# Patient Record
Sex: FEMALE | Race: WHITE | NOT HISPANIC OR LATINO
[De-identification: names, ages, dates, MRNs, and addresses within clinical notes are randomized per-mention and may not be internally consistent; named-entity substitution may affect disease eponyms.]

---

## 2017-11-30 PROBLEM — Z00.00 ENCOUNTER FOR PREVENTIVE HEALTH EXAMINATION: Status: ACTIVE | Noted: 2017-11-30

## 2018-03-12 ENCOUNTER — APPOINTMENT (OUTPATIENT)
Dept: OTOLARYNGOLOGY | Facility: CLINIC | Age: 63
End: 2018-03-12
Payer: COMMERCIAL

## 2018-03-12 VITALS
SYSTOLIC BLOOD PRESSURE: 144 MMHG | BODY MASS INDEX: 24.55 KG/M2 | WEIGHT: 130 LBS | DIASTOLIC BLOOD PRESSURE: 84 MMHG | HEIGHT: 61 IN

## 2018-03-12 PROCEDURE — 92557 COMPREHENSIVE HEARING TEST: CPT

## 2018-03-12 PROCEDURE — 99204 OFFICE O/P NEW MOD 45 MIN: CPT | Mod: 25

## 2018-03-12 PROCEDURE — 92567 TYMPANOMETRY: CPT

## 2018-03-18 ENCOUNTER — FORM ENCOUNTER (OUTPATIENT)
Age: 63
End: 2018-03-18

## 2018-03-19 ENCOUNTER — OUTPATIENT (OUTPATIENT)
Dept: OUTPATIENT SERVICES | Facility: HOSPITAL | Age: 63
LOS: 1 days | End: 2018-03-19
Payer: COMMERCIAL

## 2018-03-19 ENCOUNTER — APPOINTMENT (OUTPATIENT)
Dept: MRI IMAGING | Facility: CLINIC | Age: 63
End: 2018-03-19
Payer: COMMERCIAL

## 2018-03-19 DIAGNOSIS — Z00.8 ENCOUNTER FOR OTHER GENERAL EXAMINATION: ICD-10-CM

## 2018-03-19 PROCEDURE — 70553 MRI BRAIN STEM W/O & W/DYE: CPT | Mod: 26

## 2018-03-20 ENCOUNTER — MESSAGE (OUTPATIENT)
Age: 63
End: 2018-03-20

## 2018-03-20 PROCEDURE — 70553 MRI BRAIN STEM W/O & W/DYE: CPT

## 2018-03-20 PROCEDURE — A9585: CPT

## 2018-03-20 PROCEDURE — 82565 ASSAY OF CREATININE: CPT

## 2018-03-23 ENCOUNTER — OUTPATIENT (OUTPATIENT)
Dept: OUTPATIENT SERVICES | Facility: HOSPITAL | Age: 63
LOS: 1 days | End: 2018-03-23
Payer: COMMERCIAL

## 2018-03-23 VITALS
TEMPERATURE: 98 F | WEIGHT: 130.07 LBS | HEART RATE: 72 BPM | RESPIRATION RATE: 17 BRPM | HEIGHT: 61 IN | OXYGEN SATURATION: 99 % | DIASTOLIC BLOOD PRESSURE: 69 MMHG | SYSTOLIC BLOOD PRESSURE: 118 MMHG

## 2018-03-23 DIAGNOSIS — N84.0 POLYP OF CORPUS UTERI: ICD-10-CM

## 2018-03-23 DIAGNOSIS — Z98.890 OTHER SPECIFIED POSTPROCEDURAL STATES: Chronic | ICD-10-CM

## 2018-03-23 PROCEDURE — 85027 COMPLETE CBC AUTOMATED: CPT

## 2018-03-23 PROCEDURE — G0463: CPT

## 2018-03-23 RX ORDER — SODIUM CHLORIDE 9 MG/ML
3 INJECTION INTRAMUSCULAR; INTRAVENOUS; SUBCUTANEOUS EVERY 8 HOURS
Qty: 0 | Refills: 0 | Status: DISCONTINUED | OUTPATIENT
Start: 2018-03-29 | End: 2018-04-13

## 2018-03-23 RX ORDER — LIDOCAINE HCL 20 MG/ML
0.2 VIAL (ML) INJECTION ONCE
Qty: 0 | Refills: 0 | Status: DISCONTINUED | OUTPATIENT
Start: 2018-03-29 | End: 2018-04-13

## 2018-03-23 NOTE — H&P PST ADULT - HISTORY OF PRESENT ILLNESS
62 yr old female with history of fibroids. On routine follow up dx with polyp of corpus uteri for surgery.

## 2018-03-23 NOTE — H&P PST ADULT - NSANTHOSAYNRD_GEN_A_CORE
No. PROSPER screening performed.  STOP BANG Legend: 0-2 = LOW Risk; 3-4 = INTERMEDIATE Risk; 5-8 = HIGH Risk

## 2018-03-28 ENCOUNTER — TRANSCRIPTION ENCOUNTER (OUTPATIENT)
Age: 63
End: 2018-03-28

## 2018-03-29 ENCOUNTER — OUTPATIENT (OUTPATIENT)
Dept: OUTPATIENT SERVICES | Facility: HOSPITAL | Age: 63
LOS: 1 days | End: 2018-03-29
Payer: COMMERCIAL

## 2018-03-29 ENCOUNTER — RESULT REVIEW (OUTPATIENT)
Age: 63
End: 2018-03-29

## 2018-03-29 VITALS
OXYGEN SATURATION: 98 % | WEIGHT: 130.07 LBS | HEART RATE: 66 BPM | SYSTOLIC BLOOD PRESSURE: 118 MMHG | TEMPERATURE: 98 F | DIASTOLIC BLOOD PRESSURE: 74 MMHG | HEIGHT: 61 IN | RESPIRATION RATE: 16 BRPM

## 2018-03-29 VITALS
RESPIRATION RATE: 14 BRPM | TEMPERATURE: 97 F | SYSTOLIC BLOOD PRESSURE: 130 MMHG | OXYGEN SATURATION: 100 % | HEART RATE: 63 BPM | DIASTOLIC BLOOD PRESSURE: 62 MMHG

## 2018-03-29 DIAGNOSIS — Z98.890 OTHER SPECIFIED POSTPROCEDURAL STATES: Chronic | ICD-10-CM

## 2018-03-29 DIAGNOSIS — N84.0 POLYP OF CORPUS UTERI: ICD-10-CM

## 2018-03-29 PROCEDURE — 88305 TISSUE EXAM BY PATHOLOGIST: CPT | Mod: 26

## 2018-03-29 PROCEDURE — 58558 HYSTEROSCOPY BIOPSY: CPT

## 2018-03-29 PROCEDURE — 88305 TISSUE EXAM BY PATHOLOGIST: CPT

## 2018-03-29 RX ORDER — SODIUM CHLORIDE 9 MG/ML
1000 INJECTION, SOLUTION INTRAVENOUS
Qty: 0 | Refills: 0 | Status: DISCONTINUED | OUTPATIENT
Start: 2018-03-29 | End: 2018-04-13

## 2018-03-29 RX ORDER — FAMOTIDINE 10 MG/ML
1 INJECTION INTRAVENOUS
Qty: 0 | Refills: 0 | COMMUNITY

## 2018-03-29 RX ORDER — CELECOXIB 200 MG/1
200 CAPSULE ORAL ONCE
Qty: 0 | Refills: 0 | Status: DISCONTINUED | OUTPATIENT
Start: 2018-03-29 | End: 2018-03-29

## 2018-03-29 RX ORDER — ATORVASTATIN CALCIUM 80 MG/1
1 TABLET, FILM COATED ORAL
Qty: 0 | Refills: 0 | COMMUNITY

## 2018-03-29 RX ORDER — CELECOXIB 200 MG/1
200 CAPSULE ORAL ONCE
Qty: 0 | Refills: 0 | Status: COMPLETED | OUTPATIENT
Start: 2018-03-29 | End: 2018-03-29

## 2018-03-29 RX ORDER — ACETAMINOPHEN 500 MG
1000 TABLET ORAL ONCE
Qty: 0 | Refills: 0 | Status: DISCONTINUED | OUTPATIENT
Start: 2018-03-29 | End: 2018-03-29

## 2018-03-29 RX ORDER — OXYCODONE HYDROCHLORIDE 5 MG/1
5 TABLET ORAL ONCE
Qty: 0 | Refills: 0 | Status: DISCONTINUED | OUTPATIENT
Start: 2018-03-29 | End: 2018-03-29

## 2018-03-29 RX ORDER — ACETAMINOPHEN 500 MG
1000 TABLET ORAL ONCE
Qty: 0 | Refills: 0 | Status: DISCONTINUED | OUTPATIENT
Start: 2018-03-29 | End: 2018-04-13

## 2018-03-29 RX ORDER — ONDANSETRON 8 MG/1
4 TABLET, FILM COATED ORAL ONCE
Qty: 0 | Refills: 0 | Status: COMPLETED | OUTPATIENT
Start: 2018-03-29 | End: 2018-03-29

## 2018-03-29 RX ADMIN — ONDANSETRON 4 MILLIGRAM(S): 8 TABLET, FILM COATED ORAL at 09:10

## 2018-03-29 RX ADMIN — CELECOXIB 200 MILLIGRAM(S): 200 CAPSULE ORAL at 09:10

## 2018-03-29 RX ADMIN — CELECOXIB 200 MILLIGRAM(S): 200 CAPSULE ORAL at 06:38

## 2018-03-29 RX ADMIN — Medication 1000 MILLIGRAM(S): at 06:37

## 2018-03-29 NOTE — ASU DISCHARGE PLAN (ADULT/PEDIATRIC). - NOTIFY
GYN Fever>100.4/Unable to Urinate/Pain not relieved by Medications/Persistent Nausea and Vomiting/Bleeding that does not stop

## 2018-03-29 NOTE — BRIEF OPERATIVE NOTE - PROCEDURE
<<-----Click on this checkbox to enter Procedure Dilation and curettage  03/29/2018    Active  [id number]  Operative hysteroscopy with bipolar resectoscope with automated tissue fragment removal system  03/29/2018    Active  [id number]

## 2018-03-29 NOTE — BRIEF OPERATIVE NOTE - OPERATION/FINDINGS
Anteverted uterus dilated to 7.5mm with eleanor dilators. Small polyp near L ostia and small cervical polyp resected with symphion device.

## 2018-04-03 LAB — SURGICAL PATHOLOGY STUDY: SIGNIFICANT CHANGE UP

## 2018-04-12 ENCOUNTER — APPOINTMENT (OUTPATIENT)
Dept: PHARMACY | Facility: CLINIC | Age: 63
End: 2018-04-12
Payer: SELF-PAY

## 2018-04-12 PROCEDURE — 92591 HEARING AID EXAMINATION & SELECTION BINAURAL: CPT | Mod: NC

## 2018-06-04 ENCOUNTER — APPOINTMENT (OUTPATIENT)
Dept: OTOLARYNGOLOGY | Facility: CLINIC | Age: 63
End: 2018-06-04

## 2018-06-14 ENCOUNTER — APPOINTMENT (OUTPATIENT)
Dept: OTOLARYNGOLOGY | Facility: CLINIC | Age: 63
End: 2018-06-14

## 2018-08-30 PROBLEM — D21.9 BENIGN NEOPLASM OF CONNECTIVE AND OTHER SOFT TISSUE, UNSPECIFIED: Chronic | Status: ACTIVE | Noted: 2018-03-23

## 2018-08-30 PROBLEM — E78.5 HYPERLIPIDEMIA, UNSPECIFIED: Chronic | Status: ACTIVE | Noted: 2018-03-23

## 2018-08-30 PROBLEM — N84.0 POLYP OF CORPUS UTERI: Chronic | Status: ACTIVE | Noted: 2018-03-23

## 2018-11-26 ENCOUNTER — APPOINTMENT (OUTPATIENT)
Dept: OTOLARYNGOLOGY | Facility: CLINIC | Age: 63
End: 2018-11-26
Payer: COMMERCIAL

## 2018-11-26 VITALS
SYSTOLIC BLOOD PRESSURE: 116 MMHG | WEIGHT: 130 LBS | DIASTOLIC BLOOD PRESSURE: 75 MMHG | HEIGHT: 61 IN | BODY MASS INDEX: 24.55 KG/M2

## 2018-11-26 PROCEDURE — 92567 TYMPANOMETRY: CPT

## 2018-11-26 PROCEDURE — 99213 OFFICE O/P EST LOW 20 MIN: CPT | Mod: 25

## 2018-11-26 PROCEDURE — 92557 COMPREHENSIVE HEARING TEST: CPT

## 2018-12-06 ENCOUNTER — APPOINTMENT (OUTPATIENT)
Dept: PHARMACY | Facility: CLINIC | Age: 63
End: 2018-12-06
Payer: SELF-PAY

## 2018-12-06 PROCEDURE — V5010 ASSESSMENT FOR HEARING AID: CPT | Mod: NC

## 2019-01-09 ENCOUNTER — APPOINTMENT (OUTPATIENT)
Dept: PHARMACY | Facility: CLINIC | Age: 64
End: 2019-01-09

## 2019-01-11 ENCOUNTER — MESSAGE (OUTPATIENT)
Age: 64
End: 2019-01-11

## 2019-01-17 ENCOUNTER — APPOINTMENT (OUTPATIENT)
Dept: PHARMACY | Facility: CLINIC | Age: 64
End: 2019-01-17
Payer: SELF-PAY

## 2019-01-17 PROCEDURE — V5299A: CUSTOM | Mod: NC

## 2019-02-06 ENCOUNTER — APPOINTMENT (OUTPATIENT)
Dept: PHARMACY | Facility: CLINIC | Age: 64
End: 2019-02-06
Payer: SELF-PAY

## 2019-02-06 PROCEDURE — V5299A: CUSTOM | Mod: NC

## 2019-02-26 ENCOUNTER — APPOINTMENT (OUTPATIENT)
Dept: PHARMACY | Facility: CLINIC | Age: 64
End: 2019-02-26
Payer: SELF-PAY

## 2019-02-26 PROCEDURE — V5299A: CUSTOM | Mod: NC

## 2019-03-11 ENCOUNTER — APPOINTMENT (OUTPATIENT)
Dept: PHARMACY | Facility: CLINIC | Age: 64
End: 2019-03-11

## 2019-03-12 ENCOUNTER — APPOINTMENT (OUTPATIENT)
Dept: PHARMACY | Facility: CLINIC | Age: 64
End: 2019-03-12
Payer: COMMERCIAL

## 2019-03-12 PROCEDURE — V5299A: CUSTOM | Mod: NC

## 2019-03-19 ENCOUNTER — APPOINTMENT (OUTPATIENT)
Dept: PHARMACY | Facility: CLINIC | Age: 64
End: 2019-03-19
Payer: SELF-PAY

## 2019-03-19 PROCEDURE — V5299A: CUSTOM | Mod: NC

## 2019-03-29 ENCOUNTER — APPOINTMENT (OUTPATIENT)
Dept: PHARMACY | Facility: CLINIC | Age: 64
End: 2019-03-29
Payer: SELF-PAY

## 2019-03-29 PROCEDURE — V5299A: CUSTOM | Mod: NC

## 2019-05-07 ENCOUNTER — APPOINTMENT (OUTPATIENT)
Dept: PHARMACY | Facility: CLINIC | Age: 64
End: 2019-05-07

## 2019-05-29 ENCOUNTER — APPOINTMENT (OUTPATIENT)
Dept: PHARMACY | Facility: CLINIC | Age: 64
End: 2019-05-29
Payer: SELF-PAY

## 2019-05-29 PROCEDURE — V5267D: CUSTOM

## 2019-05-29 PROCEDURE — V5299A: CUSTOM | Mod: NC

## 2019-06-18 ENCOUNTER — APPOINTMENT (OUTPATIENT)
Dept: PHARMACY | Facility: CLINIC | Age: 64
End: 2019-06-18
Payer: SELF-PAY

## 2019-06-18 PROCEDURE — V5299A: CUSTOM | Mod: NC

## 2019-07-12 ENCOUNTER — APPOINTMENT (OUTPATIENT)
Dept: PHARMACY | Facility: CLINIC | Age: 64
End: 2019-07-12
Payer: SELF-PAY

## 2019-07-12 PROCEDURE — V5299A: CUSTOM | Mod: NC

## 2019-09-10 ENCOUNTER — APPOINTMENT (OUTPATIENT)
Dept: PHARMACY | Facility: CLINIC | Age: 64
End: 2019-09-10

## 2019-09-11 ENCOUNTER — APPOINTMENT (OUTPATIENT)
Dept: PHARMACY | Facility: CLINIC | Age: 64
End: 2019-09-11
Payer: SELF-PAY

## 2019-09-11 PROCEDURE — V5299A: CUSTOM | Mod: NC

## 2019-11-25 ENCOUNTER — APPOINTMENT (OUTPATIENT)
Dept: OTOLARYNGOLOGY | Facility: CLINIC | Age: 64
End: 2019-11-25
Payer: COMMERCIAL

## 2019-11-25 PROCEDURE — 92557 COMPREHENSIVE HEARING TEST: CPT

## 2019-11-25 PROCEDURE — 92567 TYMPANOMETRY: CPT

## 2019-11-25 PROCEDURE — 99213 OFFICE O/P EST LOW 20 MIN: CPT | Mod: 25

## 2019-12-15 NOTE — HISTORY OF PRESENT ILLNESS
[de-identified] : 64F here for f/u for hearing loss. Pt with h/o symmetric mixed hearing loss-  but reports inadequate benefit from current hearing aids- did not pursue new HAs since last visit- wears them consistently.  Pt feels hearing may have worsened - states she is unable to hear without hearing aids unless someone is next to her Right ear- has started reading CC while watching TV. Pt denies otalgia, otorrhea, ear infections, tinnitus, dizziness, vertigo or headaches related to hearing.

## 2019-12-30 ENCOUNTER — OTHER (OUTPATIENT)
Age: 64
End: 2019-12-30

## 2020-11-23 ENCOUNTER — APPOINTMENT (OUTPATIENT)
Dept: OTOLARYNGOLOGY | Facility: CLINIC | Age: 65
End: 2020-11-23
Payer: COMMERCIAL

## 2020-11-23 PROCEDURE — 92567 TYMPANOMETRY: CPT

## 2020-11-23 PROCEDURE — 99072 ADDL SUPL MATRL&STAF TM PHE: CPT

## 2020-11-23 PROCEDURE — 99213 OFFICE O/P EST LOW 20 MIN: CPT | Mod: 25

## 2020-11-23 PROCEDURE — 92557 COMPREHENSIVE HEARING TEST: CPT

## 2020-11-23 NOTE — HISTORY OF PRESENT ILLNESS
[de-identified] : 65F here for f/u for mixed hearing loss- with b/l HA - not doing well with hearing aid

## 2020-12-22 ENCOUNTER — APPOINTMENT (OUTPATIENT)
Dept: PHARMACY | Facility: CLINIC | Age: 65
End: 2020-12-22
Payer: SELF-PAY

## 2020-12-22 PROCEDURE — V5267D: CUSTOM

## 2020-12-22 PROCEDURE — V5299A: CUSTOM | Mod: NC,LT

## 2020-12-23 ENCOUNTER — APPOINTMENT (OUTPATIENT)
Dept: PHARMACY | Facility: CLINIC | Age: 65
End: 2020-12-23

## 2021-01-11 ENCOUNTER — APPOINTMENT (OUTPATIENT)
Dept: PHARMACY | Facility: CLINIC | Age: 66
End: 2021-01-11
Payer: SELF-PAY

## 2021-01-11 PROCEDURE — V5299A: CUSTOM | Mod: NC,LT

## 2021-03-03 ENCOUNTER — APPOINTMENT (OUTPATIENT)
Dept: PHARMACY | Facility: CLINIC | Age: 66
End: 2021-03-03
Payer: SELF-PAY

## 2021-03-03 PROCEDURE — V5299A: CUSTOM | Mod: NC

## 2021-11-17 ENCOUNTER — APPOINTMENT (OUTPATIENT)
Dept: PHARMACY | Facility: CLINIC | Age: 66
End: 2021-11-17
Payer: SELF-PAY

## 2021-11-17 PROCEDURE — V5267D: CUSTOM

## 2021-11-22 ENCOUNTER — APPOINTMENT (OUTPATIENT)
Dept: OTOLARYNGOLOGY | Facility: CLINIC | Age: 66
End: 2021-11-22
Payer: COMMERCIAL

## 2021-11-22 PROCEDURE — 99213 OFFICE O/P EST LOW 20 MIN: CPT | Mod: 25

## 2021-11-22 PROCEDURE — 92567 TYMPANOMETRY: CPT

## 2021-11-22 PROCEDURE — 92557 COMPREHENSIVE HEARING TEST: CPT

## 2021-11-22 RX ORDER — ATORVASTATIN CALCIUM 20 MG/1
20 TABLET, FILM COATED ORAL
Qty: 90 | Refills: 0 | Status: DISCONTINUED | COMMUNITY
Start: 2017-03-01 | End: 2021-11-22

## 2021-11-22 RX ORDER — CYCLOBENZAPRINE HYDROCHLORIDE 5 MG/1
5 TABLET, FILM COATED ORAL
Qty: 15 | Refills: 0 | Status: DISCONTINUED | COMMUNITY
Start: 2018-02-22 | End: 2021-11-22

## 2021-11-22 RX ORDER — ROSUVASTATIN CALCIUM 5 MG/1
TABLET, FILM COATED ORAL
Refills: 0 | Status: DISCONTINUED | COMMUNITY
End: 2021-11-22

## 2021-11-22 RX ORDER — MELOXICAM 7.5 MG/1
7.5 TABLET ORAL
Qty: 20 | Refills: 0 | Status: DISCONTINUED | COMMUNITY
Start: 2018-02-22 | End: 2021-11-22

## 2021-12-11 NOTE — DATA REVIEWED
[de-identified] : Ad: Severe to moderate mixed HL. \par As: Profound to moderately-severe mixed HL\par Type A tymps, AU. Excellent WRS, AU.

## 2021-12-11 NOTE — HISTORY OF PRESENT ILLNESS
[de-identified] : 66F here for f/u for mixed hearing loss- with b/l HA- using consistently- hearing feels stable. Pt denies otalgia, otorrhea, ear infections, hearing loss.

## 2021-12-27 ENCOUNTER — APPOINTMENT (OUTPATIENT)
Dept: PHARMACY | Facility: CLINIC | Age: 66
End: 2021-12-27

## 2022-02-11 NOTE — ASU PREOP CHECKLIST - VERIFY SURGICAL SITE/SIDE WITH PATIENT
No apt avail with providers requested, spoke with patient and was able to sched with Dr Myra Plummer for 2/14 done/gyn

## 2022-05-25 ENCOUNTER — APPOINTMENT (OUTPATIENT)
Dept: OTOLARYNGOLOGY | Facility: CLINIC | Age: 67
End: 2022-05-25
Payer: COMMERCIAL

## 2022-05-25 ENCOUNTER — APPOINTMENT (OUTPATIENT)
Dept: PHARMACY | Facility: CLINIC | Age: 67
End: 2022-05-25

## 2022-05-25 VITALS
WEIGHT: 120 LBS | HEART RATE: 60 BPM | DIASTOLIC BLOOD PRESSURE: 69 MMHG | BODY MASS INDEX: 22.66 KG/M2 | HEIGHT: 61 IN | SYSTOLIC BLOOD PRESSURE: 112 MMHG

## 2022-05-25 PROCEDURE — 92567 TYMPANOMETRY: CPT

## 2022-05-25 PROCEDURE — 99213 OFFICE O/P EST LOW 20 MIN: CPT

## 2022-05-25 PROCEDURE — 92557 COMPREHENSIVE HEARING TEST: CPT

## 2022-06-20 ENCOUNTER — APPOINTMENT (OUTPATIENT)
Dept: PHARMACY | Facility: CLINIC | Age: 67
End: 2022-06-20

## 2022-06-23 NOTE — DATA REVIEWED
[de-identified] : Right ear: Essentially moderately-severe/severe rising to moderate mixed HL from 250H-8kHz\par Left ear: Profound rising to moderately-severe mixed HL from 250Hz-8kHz\par Type A tymps Au\par

## 2022-06-23 NOTE — HISTORY OF PRESENT ILLNESS
[de-identified] : 67 yo with mixed hearing loss presents for follow up. Wears bilateral HAs consistently. No noticeable change in hearing. No tinnitus, otalgia, otorrhea, ear infections, dizziness or headaches.

## 2023-05-17 ENCOUNTER — APPOINTMENT (OUTPATIENT)
Dept: PHARMACY | Facility: CLINIC | Age: 68
End: 2023-05-17
Payer: SELF-PAY

## 2023-05-17 ENCOUNTER — APPOINTMENT (OUTPATIENT)
Dept: OTOLARYNGOLOGY | Facility: CLINIC | Age: 68
End: 2023-05-17
Payer: COMMERCIAL

## 2023-05-17 VITALS
SYSTOLIC BLOOD PRESSURE: 113 MMHG | HEIGHT: 61 IN | DIASTOLIC BLOOD PRESSURE: 71 MMHG | WEIGHT: 115 LBS | HEART RATE: 65 BPM | BODY MASS INDEX: 21.71 KG/M2

## 2023-05-17 PROCEDURE — 92567 TYMPANOMETRY: CPT

## 2023-05-17 PROCEDURE — V5267D: CUSTOM

## 2023-05-17 PROCEDURE — 92557 COMPREHENSIVE HEARING TEST: CPT

## 2023-05-17 PROCEDURE — 99213 OFFICE O/P EST LOW 20 MIN: CPT

## 2023-05-17 RX ORDER — OMEPRAZOLE 20 MG/1
TABLET, DELAYED RELEASE ORAL
Refills: 0 | Status: ACTIVE | COMMUNITY

## 2023-05-18 ENCOUNTER — NON-APPOINTMENT (OUTPATIENT)
Age: 68
End: 2023-05-18

## 2023-05-20 NOTE — HISTORY OF PRESENT ILLNESS
[de-identified] : 67 year old woman, annual follow up for mixed hearing loss. History of asymmetrical SNHL - wearing hearing aids - recently has to increase amplification on device - Left worse than Right.  Denies otalgia, otorrhea, tinnitus, dizziness, vertigo, headaches related to ears, recent fevers or ear infection.

## 2023-05-20 NOTE — DATA REVIEWED
[de-identified] : Right - severe rising to a moderate mixed hearing loss\par Left - profound rising to a moderate mixed hearing loss \par Impedance testing reveals normal Type A tympanograms bilaterally\par

## 2023-05-22 ENCOUNTER — APPOINTMENT (OUTPATIENT)
Dept: SURGICAL ONCOLOGY | Facility: CLINIC | Age: 68
End: 2023-05-22
Payer: COMMERCIAL

## 2023-05-22 VITALS
OXYGEN SATURATION: 99 % | HEART RATE: 66 BPM | DIASTOLIC BLOOD PRESSURE: 77 MMHG | BODY MASS INDEX: 21.71 KG/M2 | SYSTOLIC BLOOD PRESSURE: 132 MMHG | RESPIRATION RATE: 16 BRPM | WEIGHT: 115 LBS | HEIGHT: 61 IN

## 2023-05-22 PROCEDURE — 99245 OFF/OP CONSLTJ NEW/EST HI 55: CPT

## 2023-05-22 NOTE — END OF VISIT
REST AND HYDRATE WITH PLENTY OF FLUIDS  CIPRO RX FOR 7 DAYS  PHENAZOPYRIDINE FOR BLADDER SPASM, PAIN, AND URINARY FREQUENCY, URGENCY.  OK TO CONTINUE ADVIL COLD AND SINUS [id number] THAT YOU HAVE BEEN  TYLENOL FOR PAIN  SEE ACUTE CYSTITIS SHEET  SEE SINUS HEADACHE SYMPTOMS    FOLLOW UP WITH YOUR PCP AND RETURN FOR ANY CONCERNS OR PROBLEMS    GOT O THE ER IF WORSE DESPITE TREATMENT  Blood in the Urine    Blood in the urine (hematuria) has many possible causes. If it occurs after an injury (such as a car accident or fall), it is most often a sign of bruising to the kidney or bladder. Common causes of blood in the urine include urinary tract infections, kidney stones, inflammation, tumors, or certain other diseases of the kidney or bladder. Menstruation can cause blood to appear in the urine sample, although it is not coming from the urinary tract.  If only a trace amount of blood is present, it will show up on the urine test, even though the urine may be yellow and not pink or red. This may occur with any of the above conditions, as well as heavy exercise or high fever. In this case, your doctor may want to repeat the urine test on another day. This will show if the blood is still present. If it is, then other tests can be done to find out the cause.  Home care  Follow these home care guidelines:  · If your urine does not appear bloody (pink, brown or red) then you do not need to restrict your activity in any way.  · If you can see blood in your urine, rest and avoid heavy exertion until your next exam. Do not use aspirin, blood thinners, or anti-platelet or anti-inflammatory medicines. These include ibuprofen and naproxen. These thin the blood and may increase bleeding.  Follow-up care  Follow up with your healthcare provider, or as advised. If you were injured and had blood in your urine, you should have a repeat urine test in 1 to 2 days. Contact your doctor for this test.  A radiologist will review any X-rays that  "were taken. You will be told of any new findings that may affect your care.  When to seek medical advice  Call your healthcare provider right away if any of these occur:  · Bright red blood or blood clots in the urine (if you did not have this before)  · Weakness, dizziness or fainting  · New groin, abdominal, or back pain  · Fever of 100.4ºF (38ºC) or higher, or as directed by your healthcare provider  · Repeated vomiting  · Bleeding from the nose or gums or easy bruising  Date Last Reviewed: 9/1/2016 © 2000-2017 [employer]. [address] [place] [address]. All rights reserved. This information is not intended as a substitute for professional medical care. Always follow your healthcare professional's instructions.        Bladder Infection, Female (Adult)    Urine is normally doesn't have any bacteria in it. But bacteria can get into the urinary tract from the skin around the rectum. Or they can travel in the blood from elsewhere in the body. Once they are in your urinary tract, they can cause infection in the urethra (urethritis), the bladder (cystitis), or the kidneys (pyelonephritis).  The most common place for an infection is in the bladder. This is called a bladder infection. This is one of the most common infections in women. Most bladder infections are easily treated. They are not serious unless the infection spreads to the kidney.  The phrases "bladder infection," "UTI," and "cystitis" are often used to describe the same thing. But they are not always the same. Cystitis is an inflammation of the bladder. The most common cause of cystitis is an infection.  Symptoms  The infection causes inflammation in the urethra and bladder. This causes many of the symptoms. The most common symptoms of a bladder infection are:  · Pain or burning when urinating  · Having to urinate more often than usual  · Urgent need to urinate  · Only a small amount of urine comes out  · Blood in " urine  · Abdominal discomfort. This is usually in the lower abdomen above the pubic bone.  · Cloudy urine  · Strong- or bad-smelling urine  · Unable to urinate (urinary retention)  · Unable to hold urine in (urinary incontinence)  · Fever  · Loss of appetite  · Confusion (in older adults)  Causes  Bladder infections are not contagious. You can't get one from someone else, from a toilet seat, or from sharing a bath.  The most common cause of bladder infections is bacteria from the bowels. The bacteria get onto the skin around the opening of the urethra. From there, they can get into the urine and travel up to the bladder, causing inflammation and infection. This usually happens because of:  · Wiping improperly after urinating. Always wipe from front to back.  · Bowel incontinence  · Pregnancy  · Procedures such as having a catheter inserted  · Older age  · Not emptying your bladder. This can allow bacteria a chance to grow in your urine.  · Dehydration  · Constipation  · Sex  · Use of a diaphragm for birth control   Treatment  Bladder infections are diagnosed by a urine test. They are treated with antibiotics and usually clear up quickly without complications. Treatment helps prevent a more serious kidney infection.  Medicines  Medicines can help in the treatment of a bladder infection:  · Take antibiotics until they are used up, even if you feel better. It is important to finish them to make sure the infection has cleared.  · You can use acetaminophen or ibuprofen for pain, fever, or discomfort, unless another medicine was prescribed. If you have chronic liver or kidney disease, talk with your healthcare provider before using these medicines. Also talk with your provider if you've ever had a stomach ulcer or gastrointestinal bleeding, or are taking blood-thinner medicines.  · If you are given phenazopydridine to reduce burning with urination, it will cause your urine to become a bright orange color. This can stain  clothing.  Care and prevention  These self-care steps can help prevent future infections:  · Drink plenty of fluids to prevent dehydration and flush out your bladder. Do this unless you must restrict fluids for other health reasons, or your doctor told you not to.  · Proper cleaning after going to the bathroom is important. Wipe from front to back after using the toilet to prevent the spread of bacteria.  · Urinate more often. Don't try to hold urine in for a long time.  · Wear loose-fitting clothes and cotton underwear. Avoid tight-fitting pants.  · Improve your diet and prevent constipation. Eat more fresh fruit and vegetables, and fiber, and less junk and fatty foods.  · Avoid sex until your symptoms are gone.  · Avoid caffeine, alcohol, and spicy foods. These can irritate your bladder.  · Urinate right after intercourse to flush out your bladder.  · If you use birth control pills and have frequent bladder infections, discuss it with your doctor.  Follow-up care  Call your healthcare provider if all symptoms are not gone after 3 days of treatment. This is especially important if you have repeat infections.  If a culture was done, you will be told if your treatment needs to be changed. If directed, you can call to find out the results.  If X-rays were done, you will be told if the results will affect your treatment.  Call 911  Call 911 if any of the following occur:  · Trouble breathing  · Hard to wake up or confusion  · Fainting or loss of consciousness  · Rapid heart rate  When to seek medical advice  Call your healthcare provider right away if any of these occur:  · Fever of 100.4ºF (38.0ºC) or higher, or as directed by your healthcare provider  · Symptoms are not better by the third day of treatment  · Back or belly (abdominal) pain that gets worse  · Repeated vomiting, or unable to keep medicine down  · Weakness or dizziness  · Vaginal discharge  · Pain, redness, or swelling in the outer vaginal area  (labia)  Date Last Reviewed: 10/1/2016  © 5834-6823 [employer]. [address] [place] [address], [place] [address]. All rights reserved. This information is not intended as a substitute for professional medical care. Always follow your healthcare professional's instructions.         [Time Spent: ___ minutes] : I have spent [unfilled] minutes of time on the encounter.

## 2023-05-23 ENCOUNTER — RESULT REVIEW (OUTPATIENT)
Age: 68
End: 2023-05-23

## 2023-05-30 ENCOUNTER — OUTPATIENT (OUTPATIENT)
Dept: OUTPATIENT SERVICES | Facility: HOSPITAL | Age: 68
LOS: 1 days | End: 2023-05-30
Payer: COMMERCIAL

## 2023-05-30 VITALS
HEART RATE: 64 BPM | TEMPERATURE: 98 F | DIASTOLIC BLOOD PRESSURE: 73 MMHG | RESPIRATION RATE: 16 BRPM | HEIGHT: 61 IN | OXYGEN SATURATION: 98 % | WEIGHT: 117.07 LBS | SYSTOLIC BLOOD PRESSURE: 134 MMHG

## 2023-05-30 DIAGNOSIS — D05.10 INTRADUCTAL CARCINOMA IN SITU OF UNSPECIFIED BREAST: ICD-10-CM

## 2023-05-30 DIAGNOSIS — K21.9 GASTRO-ESOPHAGEAL REFLUX DISEASE WITHOUT ESOPHAGITIS: ICD-10-CM

## 2023-05-30 DIAGNOSIS — Z98.890 OTHER SPECIFIED POSTPROCEDURAL STATES: Chronic | ICD-10-CM

## 2023-05-30 LAB
A1C WITH ESTIMATED AVERAGE GLUCOSE RESULT: 5.8 % — HIGH (ref 4–5.6)
ALBUMIN SERPL ELPH-MCNC: 4.5 G/DL — SIGNIFICANT CHANGE UP (ref 3.3–5)
ALP SERPL-CCNC: 104 U/L — SIGNIFICANT CHANGE UP (ref 40–120)
ALT FLD-CCNC: 28 U/L — SIGNIFICANT CHANGE UP (ref 4–33)
ANION GAP SERPL CALC-SCNC: 12 MMOL/L — SIGNIFICANT CHANGE UP (ref 7–14)
AST SERPL-CCNC: 20 U/L — SIGNIFICANT CHANGE UP (ref 4–32)
BILIRUB SERPL-MCNC: <0.2 MG/DL — SIGNIFICANT CHANGE UP (ref 0.2–1.2)
BUN SERPL-MCNC: 22 MG/DL — SIGNIFICANT CHANGE UP (ref 7–23)
CALCIUM SERPL-MCNC: 9.5 MG/DL — SIGNIFICANT CHANGE UP (ref 8.4–10.5)
CHLORIDE SERPL-SCNC: 103 MMOL/L — SIGNIFICANT CHANGE UP (ref 98–107)
CO2 SERPL-SCNC: 24 MMOL/L — SIGNIFICANT CHANGE UP (ref 22–31)
CREAT SERPL-MCNC: 0.91 MG/DL — SIGNIFICANT CHANGE UP (ref 0.5–1.3)
EGFR: 70 ML/MIN/1.73M2 — SIGNIFICANT CHANGE UP
ESTIMATED AVERAGE GLUCOSE: 120 — SIGNIFICANT CHANGE UP
GLUCOSE SERPL-MCNC: 88 MG/DL — SIGNIFICANT CHANGE UP (ref 70–99)
HCT VFR BLD CALC: 37.3 % — SIGNIFICANT CHANGE UP (ref 34.5–45)
HGB BLD-MCNC: 11.7 G/DL — SIGNIFICANT CHANGE UP (ref 11.5–15.5)
MCHC RBC-ENTMCNC: 27.3 PG — SIGNIFICANT CHANGE UP (ref 27–34)
MCHC RBC-ENTMCNC: 31.4 GM/DL — LOW (ref 32–36)
MCV RBC AUTO: 86.9 FL — SIGNIFICANT CHANGE UP (ref 80–100)
NRBC # BLD: 0 /100 WBCS — SIGNIFICANT CHANGE UP (ref 0–0)
NRBC # FLD: 0 K/UL — SIGNIFICANT CHANGE UP (ref 0–0)
PLATELET # BLD AUTO: 207 K/UL — SIGNIFICANT CHANGE UP (ref 150–400)
POTASSIUM SERPL-MCNC: 4 MMOL/L — SIGNIFICANT CHANGE UP (ref 3.5–5.3)
POTASSIUM SERPL-SCNC: 4 MMOL/L — SIGNIFICANT CHANGE UP (ref 3.5–5.3)
PROT SERPL-MCNC: 6.4 G/DL — SIGNIFICANT CHANGE UP (ref 6–8.3)
RBC # BLD: 4.29 M/UL — SIGNIFICANT CHANGE UP (ref 3.8–5.2)
RBC # FLD: 12.5 % — SIGNIFICANT CHANGE UP (ref 10.3–14.5)
SODIUM SERPL-SCNC: 139 MMOL/L — SIGNIFICANT CHANGE UP (ref 135–145)
WBC # BLD: 7.12 K/UL — SIGNIFICANT CHANGE UP (ref 3.8–10.5)
WBC # FLD AUTO: 7.12 K/UL — SIGNIFICANT CHANGE UP (ref 3.8–10.5)

## 2023-05-30 PROCEDURE — 93010 ELECTROCARDIOGRAM REPORT: CPT

## 2023-05-30 NOTE — H&P PST ADULT - PROBLEM SELECTOR PLAN 1
Patient tentatively scheduled for left breast mag seed localized lumpectomy on 06/06/2023.    Pre-op instructions provided. Pt given verbal and written instructions with teach back on chlorhexidine wash  and pepcid. Pt verbalized understanding with return demonstration.    Labs done.

## 2023-05-30 NOTE — H&P PST ADULT - ATTENDING COMMENTS
D/w pt plan for left magseed localized lumpectomy for DCIS    Discussed r/b/a post op expectations poss complications.      Pt understands and agrees to proceed.

## 2023-05-30 NOTE — H&P PST ADULT - NSICDXPASTMEDICALHX_GEN_ALL_CORE_FT
PAST MEDICAL HISTORY:  2019 novel coronavirus disease (COVID-19)     Hyperlipidemia, unspecified hyperlipidemia type     Intraductal carcinoma in situ of unspecified breast     Leiomyoma     Pneumonia     Polyp of corpus uteri     Prediabetes

## 2023-05-30 NOTE — H&P PST ADULT - HISTORY OF PRESENT ILLNESS
67 year old female with pre op dx of Intraductal carcinoma in situ of unspecified breast is scheduled left breast mag seed localized lumpectomy .

## 2023-05-30 NOTE — H&P PST ADULT - MUSCULOSKELETAL
negative ROM intact/no calf tenderness/normal gait/strength 5/5 bilateral upper extremities/strength 5/5 bilateral lower extremities/extremities exam

## 2023-05-30 NOTE — H&P PST ADULT - SKIN/BREAST COMMENTS
Pt reports abnormal mammogram 05/23. U/S , MRI breast and biopsy done. Pathology demonstrated DCIS, intermediate nuclear grade, ER/MI+.

## 2023-06-01 ENCOUNTER — OUTPATIENT (OUTPATIENT)
Dept: OUTPATIENT SERVICES | Facility: HOSPITAL | Age: 68
LOS: 1 days | End: 2023-06-01
Payer: COMMERCIAL

## 2023-06-01 ENCOUNTER — APPOINTMENT (OUTPATIENT)
Dept: MAMMOGRAPHY | Facility: CLINIC | Age: 68
End: 2023-06-01
Payer: COMMERCIAL

## 2023-06-01 ENCOUNTER — RESULT REVIEW (OUTPATIENT)
Age: 68
End: 2023-06-01

## 2023-06-01 DIAGNOSIS — Z98.890 OTHER SPECIFIED POSTPROCEDURAL STATES: Chronic | ICD-10-CM

## 2023-06-01 DIAGNOSIS — D05.10 INTRADUCTAL CARCINOMA IN SITU OF UNSPECIFIED BREAST: ICD-10-CM

## 2023-06-01 DIAGNOSIS — Z17.0 ESTROGEN RECEPTOR POSITIVE STATUS [ER+]: ICD-10-CM

## 2023-06-01 DIAGNOSIS — D05.12 INTRADUCTAL CARCINOMA IN SITU OF LEFT BREAST: ICD-10-CM

## 2023-06-01 PROBLEM — U07.1 COVID-19: Chronic | Status: ACTIVE | Noted: 2023-05-30

## 2023-06-01 PROBLEM — J18.9 PNEUMONIA, UNSPECIFIED ORGANISM: Chronic | Status: ACTIVE | Noted: 2023-05-30

## 2023-06-01 PROBLEM — R73.03 PREDIABETES: Chronic | Status: ACTIVE | Noted: 2023-05-30

## 2023-06-01 PROCEDURE — 19125 EXCISION BREAST LESION: CPT

## 2023-06-01 PROCEDURE — 19281 PERQ DEVICE BREAST 1ST IMAG: CPT

## 2023-06-01 PROCEDURE — A4648: CPT

## 2023-06-01 PROCEDURE — 19281 PERQ DEVICE BREAST 1ST IMAG: CPT | Mod: LT

## 2023-06-02 LAB — SURGICAL PATHOLOGY STUDY: SIGNIFICANT CHANGE UP

## 2023-06-05 ENCOUNTER — TRANSCRIPTION ENCOUNTER (OUTPATIENT)
Age: 68
End: 2023-06-05

## 2023-06-06 ENCOUNTER — RESULT REVIEW (OUTPATIENT)
Age: 68
End: 2023-06-06

## 2023-06-06 ENCOUNTER — OUTPATIENT (OUTPATIENT)
Dept: OUTPATIENT SERVICES | Facility: HOSPITAL | Age: 68
LOS: 1 days | End: 2023-06-06
Payer: COMMERCIAL

## 2023-06-06 ENCOUNTER — TRANSCRIPTION ENCOUNTER (OUTPATIENT)
Age: 68
End: 2023-06-06

## 2023-06-06 ENCOUNTER — APPOINTMENT (OUTPATIENT)
Dept: SURGICAL ONCOLOGY | Facility: AMBULATORY SURGERY CENTER | Age: 68
End: 2023-06-06

## 2023-06-06 ENCOUNTER — APPOINTMENT (OUTPATIENT)
Dept: MAMMOGRAPHY | Facility: IMAGING CENTER | Age: 68
End: 2023-06-06
Payer: COMMERCIAL

## 2023-06-06 ENCOUNTER — OUTPATIENT (OUTPATIENT)
Dept: OUTPATIENT SERVICES | Facility: HOSPITAL | Age: 68
LOS: 1 days | Discharge: HOME | End: 2023-06-06
Payer: COMMERCIAL

## 2023-06-06 VITALS
DIASTOLIC BLOOD PRESSURE: 73 MMHG | OXYGEN SATURATION: 98 % | HEIGHT: 61 IN | TEMPERATURE: 98 F | WEIGHT: 117.07 LBS | HEART RATE: 64 BPM | RESPIRATION RATE: 16 BRPM | SYSTOLIC BLOOD PRESSURE: 134 MMHG

## 2023-06-06 VITALS
SYSTOLIC BLOOD PRESSURE: 143 MMHG | TEMPERATURE: 97 F | RESPIRATION RATE: 17 BRPM | OXYGEN SATURATION: 97 % | DIASTOLIC BLOOD PRESSURE: 62 MMHG | HEART RATE: 66 BPM

## 2023-06-06 DIAGNOSIS — Z98.890 OTHER SPECIFIED POSTPROCEDURAL STATES: Chronic | ICD-10-CM

## 2023-06-06 DIAGNOSIS — Z00.8 ENCOUNTER FOR OTHER GENERAL EXAMINATION: ICD-10-CM

## 2023-06-06 DIAGNOSIS — D05.10 INTRADUCTAL CARCINOMA IN SITU OF UNSPECIFIED BREAST: ICD-10-CM

## 2023-06-06 PROCEDURE — 19125 EXCISION BREAST LESION: CPT

## 2023-06-06 PROCEDURE — 88307 TISSUE EXAM BY PATHOLOGIST: CPT | Mod: 26

## 2023-06-06 PROCEDURE — 76098 X-RAY EXAM SURGICAL SPECIMEN: CPT | Mod: 26

## 2023-06-06 PROCEDURE — 19301 PARTIAL MASTECTOMY: CPT | Mod: LT,58

## 2023-06-06 PROCEDURE — 76098 X-RAY EXAM SURGICAL SPECIMEN: CPT

## 2023-06-06 RX ORDER — SODIUM CHLORIDE 9 MG/ML
1000 INJECTION, SOLUTION INTRAVENOUS
Refills: 0 | Status: DISCONTINUED | OUTPATIENT
Start: 2023-06-06 | End: 2023-06-20

## 2023-06-06 RX ORDER — OMEPRAZOLE 10 MG/1
1 CAPSULE, DELAYED RELEASE ORAL
Refills: 0 | DISCHARGE

## 2023-06-06 RX ORDER — OXYCODONE HYDROCHLORIDE 5 MG/1
1 TABLET ORAL
Qty: 10 | Refills: 0
Start: 2023-06-06 | End: 2023-06-08

## 2023-06-06 NOTE — ASU DISCHARGE PLAN (ADULT/PEDIATRIC) - CALL YOUR DOCTOR IF YOU HAVE ANY OF THE FOLLOWING:
Bleeding that does not stop/Fever greater than (need to indicate Fahrenheit or Celsius) Bleeding that does not stop/Fever greater than (need to indicate Fahrenheit or Celsius)/Inability to tolerate liquids or foods

## 2023-06-06 NOTE — ASU DISCHARGE PLAN (ADULT/PEDIATRIC) - CARE PROVIDER_API CALL
Federico Stahl  Surgery  [address] [place] [id number]  Phone: [phone]  Fax: [phone]  Follow Up Time: 2 weeks

## 2023-06-06 NOTE — ASU PREOPERATIVE ASSESSMENT, ADULT ([facility]ARK ONLY) - FALL HARM RISK - UNIVERSAL INTERVENTIONS
Bed in lowest position, wheels locked, appropriate side rails in place/Call bell, personal items and telephone in reach/Instruct patient to call for assistance before getting out of bed or chair/Non-slip footwear when patient is out of bed/[place] to call system/Physically safe environment - no spills, clutter or unnecessary equipment/Purposeful Proactive Rounding/Room/bathroom lighting operational, light cord in reach

## 2023-06-12 LAB — SURGICAL PATHOLOGY STUDY: SIGNIFICANT CHANGE UP

## 2023-06-19 ENCOUNTER — APPOINTMENT (OUTPATIENT)
Dept: SURGICAL ONCOLOGY | Facility: CLINIC | Age: 68
End: 2023-06-19
Payer: COMMERCIAL

## 2023-06-19 VITALS
BODY MASS INDEX: 22.28 KG/M2 | SYSTOLIC BLOOD PRESSURE: 145 MMHG | HEART RATE: 67 BPM | HEIGHT: 61 IN | DIASTOLIC BLOOD PRESSURE: 72 MMHG | WEIGHT: 118 LBS | TEMPERATURE: 97.6 F | RESPIRATION RATE: 17 BRPM | OXYGEN SATURATION: 99 %

## 2023-06-19 PROCEDURE — 99024 POSTOP FOLLOW-UP VISIT: CPT

## 2023-06-20 NOTE — CONSULT LETTER
[Dear  ___] : Dear  [unfilled], [Consult Letter:] : I had the pleasure of evaluating your patient, [unfilled]. [Please see my note below.] : Please see my note below. [Consult Closing:] : Thank you very much for allowing me to participate in the care of this patient.  If you have any questions, please do not hesitate to contact me. [Sincerely,] : Sincerely, [FreeTextEntry2] : Chidi Rubalcava MD  [FreeTextEntry3] : Federico Stahl MD\par Surgical Oncology\par [facility]\par Office: [phone]\par Cell: [phone]\par

## 2023-06-20 NOTE — CONSULT LETTER
[Dear  ___] : Dear  [unfilled], [Consult Letter:] : I had the pleasure of evaluating your patient, [unfilled]. [Please see my note below.] : Please see my note below. [Consult Closing:] : Thank you very much for allowing me to participate in the care of this patient.  If you have any questions, please do not hesitate to contact me. [Sincerely,] : Sincerely, [FreeTextEntry2] : Chidi Rubalcava MD [FreeTextEntry3] : Federico Stahl MD\par Surgical Oncology\par [facility]\par Office: [phone]\par Cell: [phone]\par

## 2023-06-20 NOTE — ASSESSMENT
[FreeTextEntry1] : We discussed the surgical management of DCIS including breast conservation and mastectomy.  Given the small size of the primary lesion and absence of any multifocal or multicentric disease, Lexie wishes to proceed with breast conservation.  We discussed a left sided mag seed localized lumpectomy.  We discussed the risks, benefits and alternatives of the procedure.  We also discussed post operative expectations and possible complications.  Lexie expresses understanding and agrees to proceed.\par \par We also discussed the role of genetic testing given her family history (father with pancreas and prostate).  Lexie will contact us if she wishes to proceed with a referral to medical genetics for further discussion/testing.\par

## 2023-06-20 NOTE — PHYSICAL EXAM
[Normal Supraclavicular Lymph Nodes] : normal supraclavicular lymph nodes [Normal Axillary Lymph Nodes] : normal axillary lymph nodes [Normal] : oriented to person, place and time, with appropriate affect [Normal] : normal breast inspection and palpation of axillas [FreeTextEntry1] : AB present during exam

## 2023-06-20 NOTE — HISTORY OF PRESENT ILLNESS
[de-identified] : Lexie is a pleasant 67 year-old female here for an initial consultation.\par \par She has been vigilant about her yearly breast screenings and denies any prior breast history.  On 23 she completed a screening mammogram and breast ultrasound.  On mammogram, there is interval development of mild asymmetry seen in the upper outer left breast.  Ultrasound revealed interval development of a 6 mm nodule in the left breast at 2:00, N+4.  Ultrasound-guided biopsy is recommended (BIRADS 4).\par \par Biopsy was performed on 5/3/23 and pathology demonstrated DCIS, intermediate nuclear grade, ER/HI+.\par \par Breast MRI 23- newly diagnosed DCIS 2:00 left breast.  There is a 6 mm enhancing nodule with clip in place in the upper outer quadrant.  No evidence of multifocal/multicentric or contralateral disease.  \par \par PMH: GERD (on PPI, upcoming EGD evaluation)\par PSH: uterine myomectomy\par Family Hx: father + pancreatic (diagnosed 80's) & prostate cancer, mother + lymphoma\par Social Hx: non-smoker, denies ETOH, works in [employer] department\par ECO\par \par She is feeling well and denies any palpable breast masses, nipple discharge, inversion or skin changes.

## 2023-06-20 NOTE — OB HISTORY
[Menarche Age ____] : menarche age [unfilled] [Total Preg ___] : G[unfilled] [Live Births ___] : P[unfilled]  [FreeTextEntry2] : age 37 first pregnancy

## 2023-06-20 NOTE — HISTORY OF PRESENT ILLNESS
[de-identified] : Lexie is a pleasant 67 year-old female here for an post op visit \par She has been vigilant about her yearly breast screenings and denies any prior breast history.  On 23 she completed a screening mammogram and breast ultrasound.  On mammogram, there is interval development of mild asymmetry seen in the upper outer left breast.  Ultrasound revealed interval development of a 6 mm nodule in the left breast at 2:00, N+4.  Ultrasound-guided biopsy is recommended (BIRADS 4).\par \par Biopsy was performed on 5/3/23 and pathology demonstrated DCIS, intermediate nuclear grade, ER/RI+.\par \par Breast MRI 23- newly diagnosed DCIS 2:00 left breast.  There is a 6 mm enhancing nodule with clip in place in the upper outer quadrant.  No evidence of multifocal/multicentric or contralateral disease.  \par \par \par 23: Lexie is s/p left breast lumpectomy 23 final pathology revealed DCIS, intermediate nuclear grade, cribriform type, with focal necrosis, also  involving an intraductal papilloma. Margin benign. She is feeling well overall, surgical site healing well no evidence of infection, seroma or hematoma. 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

## 2023-06-20 NOTE — ASSESSMENT
[FreeTextEntry1] : Lexie and her  are relieved with her pathology results.  We discussed the role of Oncotype DCIS testing and medical oncology/radiation oncology evaluations for adjuvant therapy.  Lexie will follow up with us in 6 weeks.

## 2023-06-21 ENCOUNTER — NON-APPOINTMENT (OUTPATIENT)
Age: 68
End: 2023-06-21

## 2023-06-29 ENCOUNTER — NON-APPOINTMENT (OUTPATIENT)
Age: 68
End: 2023-06-29

## 2023-07-10 ENCOUNTER — OUTPATIENT (OUTPATIENT)
Dept: OUTPATIENT SERVICES | Facility: HOSPITAL | Age: 68
LOS: 1 days | Discharge: HOME | End: 2023-07-10
Payer: COMMERCIAL

## 2023-07-10 DIAGNOSIS — Z98.890 OTHER SPECIFIED POSTPROCEDURAL STATES: Chronic | ICD-10-CM

## 2023-07-11 ENCOUNTER — OUTPATIENT (OUTPATIENT)
Dept: OUTPATIENT SERVICES | Facility: HOSPITAL | Age: 68
LOS: 1 days | Discharge: HOME | End: 2023-07-11

## 2023-07-11 DIAGNOSIS — Z98.890 OTHER SPECIFIED POSTPROCEDURAL STATES: Chronic | ICD-10-CM

## 2023-07-11 DIAGNOSIS — C50.919 MALIGNANT NEOPLASM OF UNSPECIFIED SITE OF UNSPECIFIED FEMALE BREAST: ICD-10-CM

## 2023-07-12 ENCOUNTER — NON-APPOINTMENT (OUTPATIENT)
Age: 68
End: 2023-07-12

## 2023-07-14 ENCOUNTER — APPOINTMENT (OUTPATIENT)
Dept: HEMATOLOGY ONCOLOGY | Facility: CLINIC | Age: 68
End: 2023-07-14
Payer: COMMERCIAL

## 2023-07-14 VITALS
SYSTOLIC BLOOD PRESSURE: 119 MMHG | OXYGEN SATURATION: 97 % | HEART RATE: 65 BPM | DIASTOLIC BLOOD PRESSURE: 64 MMHG | HEIGHT: 61.02 IN | RESPIRATION RATE: 16 BRPM | WEIGHT: 119.05 LBS | BODY MASS INDEX: 22.48 KG/M2 | TEMPERATURE: 98.1 F

## 2023-07-14 DIAGNOSIS — Z80.7 FAMILY HISTORY OF OTHER MALIGNANT NEOPLASMS OF LYMPHOID, HEMATOPOIETIC AND RELATED TISSUES: ICD-10-CM

## 2023-07-14 DIAGNOSIS — Z78.9 OTHER SPECIFIED HEALTH STATUS: ICD-10-CM

## 2023-07-14 DIAGNOSIS — Z80.42 FAMILY HISTORY OF MALIGNANT NEOPLASM OF PROSTATE: ICD-10-CM

## 2023-07-14 DIAGNOSIS — H90.A32 MIXED CONDUCTIVE AND SENSORINEURAL HEARING, UNILATERAL, LEFT EAR WITH RESTRICTED HEARING ON THE  CONTRALATERAL SIDE: ICD-10-CM

## 2023-07-14 PROCEDURE — 99205 OFFICE O/P NEW HI 60 MIN: CPT

## 2023-07-14 NOTE — HISTORY OF PRESENT ILLNESS
[de-identified] : Ms. RYANN BENNETT is a 67 year old female here for an evaluation of breast cancer. Her oncologic history is as follows:\par \par On 5/1/23 she completed a screening mammogram and breast ultrasound. On mammogram, there is interval development of mild asymmetry seen in the upper outer left breast. Ultrasound revealed interval development of a 6 mm nodule in the left breast at 2:00, N+4. Ultrasound-guided biopsy is recommended (BIRADS 4).\par \par Biopsy was performed on 5/3/23 and pathology demonstrated DCIS, intermediate nuclear grade, ER/[place]+.\par \par Breast MRI 5/11/23- newly diagnosed DCIS 2:00 left breast. There is a 6 mm enhancing nodule with clip in place in the upper outer quadrant. No evidence of multifocal/multicentric or contralateral disease. \par \par 7/14/23: Patient is s/p left breast lumpectomy 6/6/23 final pathology revealed DCIS, intermediate nuclear grade, cribriform type, with focal necrosis, also involving an intraductal papilloma. Margin benign. She is feeling well overall, surgical site healing well no evidence of infection, seroma or hematoma. \par \par PMH: noncontributory; PSH: myomectomy\par Family hx: Mother: Lymphoma; Father: pancreatic and prostate cancer; paternal grandmother: leukemia\par Social: No smoking, no drinking; works as a ; post-menopausal and has not had any HRT\par

## 2023-07-14 NOTE — CONSULT LETTER
[Dear  ___] : Dear  [unfilled], [Consult Letter:] : I had the pleasure of evaluating your patient, [unfilled]. [Please see my note below.] : Please see my note below. [Consult Closing:] : Thank you very much for allowing me to participate in the care of this patient.  If you have any questions, please do not hesitate to contact me. [Sincerely,] : Sincerely, [FreeTextEntry3] : Geri Gifford MD\par Attending Physician, Division of Medical Oncology and Hematology\par Medical Director, [facility] Pregnancy [facility] Reproduction\par Medical Director, Breast Wellness and Cancer Prevention Program \par [facility]\par [facility]\par , [place] Mcdaniel [employer] [facility]

## 2023-07-14 NOTE — PHYSICAL EXAM
[Normal] : affect appropriate [Fully active, able to carry on all pre-disease performance without restriction] : Status 0 - Fully active, able to carry on all pre-disease performance without restriction [de-identified] : Well healed lumpectomy scar

## 2023-07-14 NOTE — END OF VISIT
[] : Fellow [FreeTextEntry3] : I was present with the fellow for the entire visit including history taking, exam, assessment and plan.\par \par  [Time Spent: ___ minutes] : I have spent [unfilled] minutes of time on the encounter.

## 2023-07-18 ENCOUNTER — APPOINTMENT (OUTPATIENT)
Dept: OBGYN | Facility: CLINIC | Age: 68
End: 2023-07-18
Payer: COMMERCIAL

## 2023-07-18 PROCEDURE — 99397 PER PM REEVAL EST PAT 65+ YR: CPT

## 2023-07-24 ENCOUNTER — APPOINTMENT (OUTPATIENT)
Dept: SURGICAL ONCOLOGY | Facility: CLINIC | Age: 68
End: 2023-07-24
Payer: COMMERCIAL

## 2023-07-24 VITALS
HEIGHT: 61 IN | SYSTOLIC BLOOD PRESSURE: 137 MMHG | RESPIRATION RATE: 16 BRPM | OXYGEN SATURATION: 98 % | WEIGHT: 120 LBS | DIASTOLIC BLOOD PRESSURE: 72 MMHG | HEART RATE: 60 BPM | BODY MASS INDEX: 22.66 KG/M2

## 2023-07-24 PROCEDURE — 99024 POSTOP FOLLOW-UP VISIT: CPT

## 2023-07-24 NOTE — HISTORY OF PRESENT ILLNESS
[de-identified] : Lexie is a pleasant 67 year-old female here for a post op visit \par She has been vigilant about her yearly breast screenings and denies any prior breast history.  On 23 she completed a screening mammogram and breast ultrasound.  On mammogram, there is interval development of mild asymmetry seen in the upper outer left breast.  Ultrasound revealed interval development of a 6 mm nodule in the left breast at 2:00, N+4.  Ultrasound-guided biopsy is recommended (BIRADS 4).\par \par Biopsy was performed on 5/3/23 and pathology demonstrated DCIS, intermediate nuclear grade, ER/UT+.\par \par Breast MRI 23- newly diagnosed DCIS 2:00 left breast.  There is a 6 mm enhancing nodule with clip in place in the upper outer quadrant.  No evidence of multifocal/multicentric or contralateral disease.  \par \par \par 23: Lexie is s/p left breast lumpectomy 23 final pathology revealed DCIS, intermediate nuclear grade, cribriform type, with focal necrosis, also  involving an intraductal papilloma. Margin benign. She is feeling well overall, surgical site healing well no evidence of infection, seroma or hematoma. 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 Geri Gifford and consented to begin Anastrazole.\par She is pending a consultation with Dr. Marsh from radiation oncology on 23.\par \par 23- Lexie is feeling well.  Denies any pain, swelling, erythema, drainage, fever or chills. \par

## 2023-07-24 NOTE — CONSULT LETTER
[Dear  ___] : Dear  [unfilled], [Consult Letter:] : I had the pleasure of evaluating your patient, [unfilled]. [Please see my note below.] : Please see my note below. [Consult Closing:] : Thank you very much for allowing me to participate in the care of this patient.  If you have any questions, please do not hesitate to contact me. [Sincerely,] : Sincerely, [FreeTextEntry2] : Chidi Rubalcava MD  [FreeTextEntry3] : Federico Stahl MD\par Surgical Oncology\par [facility]\par Office: [phone]\par Cell: [phone]\par

## 2023-07-24 NOTE — ASSESSMENT
[FreeTextEntry1] : Lexie will undergo a left mammogram in November 2023 to establish new baseline.  She will follow-up with us shortly thereafter.  She is following up with Dr. Marsh later this week to discuss any role for adjuvant radiation therapy.

## 2023-07-24 NOTE — PHYSICAL EXAM
[Normal] : well developed, well nourished, in no acute distress [FreeTextEntry1] : AB present during exam  [de-identified] : Left breast incision is healing well.  No erythema, induration, purulence.

## 2023-07-28 ENCOUNTER — APPOINTMENT (OUTPATIENT)
Dept: RADIATION ONCOLOGY | Facility: CLINIC | Age: 68
End: 2023-07-28
Payer: COMMERCIAL

## 2023-07-28 ENCOUNTER — APPOINTMENT (OUTPATIENT)
Dept: RADIOLOGY | Facility: CLINIC | Age: 68
End: 2023-07-28
Payer: COMMERCIAL

## 2023-07-28 ENCOUNTER — OUTPATIENT (OUTPATIENT)
Dept: OUTPATIENT SERVICES | Facility: HOSPITAL | Age: 68
LOS: 1 days | End: 2023-07-28
Payer: COMMERCIAL

## 2023-07-28 ENCOUNTER — NON-APPOINTMENT (OUTPATIENT)
Age: 68
End: 2023-07-28

## 2023-07-28 VITALS
DIASTOLIC BLOOD PRESSURE: 76 MMHG | HEIGHT: 61 IN | WEIGHT: 122.14 LBS | OXYGEN SATURATION: 99 % | BODY MASS INDEX: 23.06 KG/M2 | SYSTOLIC BLOOD PRESSURE: 126 MMHG | HEART RATE: 69 BPM

## 2023-07-28 DIAGNOSIS — Z98.890 OTHER SPECIFIED POSTPROCEDURAL STATES: Chronic | ICD-10-CM

## 2023-07-28 DIAGNOSIS — D05.10 INTRADUCTAL CARCINOMA IN SITU OF UNSPECIFIED BREAST: ICD-10-CM

## 2023-07-28 DIAGNOSIS — Z00.8 ENCOUNTER FOR OTHER GENERAL EXAMINATION: ICD-10-CM

## 2023-07-28 DIAGNOSIS — Z86.018 PERSONAL HISTORY OF OTHER BENIGN NEOPLASM: ICD-10-CM

## 2023-07-28 DIAGNOSIS — Z97.4 PRESENCE OF EXTERNAL HEARING-AID: ICD-10-CM

## 2023-07-28 PROCEDURE — 77085 DXA BONE DENSITY AXL VRT FX: CPT

## 2023-07-28 PROCEDURE — 77085 DXA BONE DENSITY AXL VRT FX: CPT | Mod: 26

## 2023-07-28 PROCEDURE — 99204 OFFICE O/P NEW MOD 45 MIN: CPT | Mod: 25

## 2023-07-28 RX ORDER — VITAMIN E (DL,TOCOPHERYL ACET) 180 MG
CAPSULE ORAL
Refills: 0 | Status: ACTIVE | COMMUNITY

## 2023-07-28 RX ORDER — CHROMIUM 200 MCG
TABLET ORAL
Refills: 0 | Status: ACTIVE | COMMUNITY

## 2023-07-28 RX ORDER — OXYCODONE AND ACETAMINOPHEN 5; 325 MG/1; MG/1
5-325 TABLET ORAL
Qty: 10 | Refills: 0 | Status: DISCONTINUED | COMMUNITY
Start: 2023-06-06 | End: 2023-07-28

## 2023-07-28 NOTE — REVIEW OF SYSTEMS
[Patient Intake Form Reviewed] : Patient intake form was reviewed [Loss of Hearing] : loss of hearing [Hot Flashes] : hot flashes [Negative] : Heme/Lymph [FreeTextEntry3] : wears glasses  [FreeTextEntry4] : bilateral hearing aids  [FreeTextEntry7] : history of GERD with some swallowing issues at times

## 2023-07-28 NOTE — HISTORY OF PRESENT ILLNESS
[FreeTextEntry1] : Ms. Reynoso is a 67 year old female who presents in consultation for consideration of radiation therapy.  She is accompanied by her  for today's visit. \par \par Diagnosis: pTis LEFT Breast, DCIS (at least 8 mm), intermediate nuclear grade, cribriform type, with focal necrosis, also involving an intraductal papilloma.  Margins of the specimen are free of tumor.   ER + (> 95%) PA + (>95%) \par Oncotype DX Breast DCIS Score: 8\par \par HPI :\par \par 5/1/23 - Screening Mammogram:  Interval development of mild asymmetry seen in the upper outer quadrant of the left breast.  Bilateral US: Interval development of a 6 mm hypoechoic nodule in the 2:00 position of the left breast.  Ultrasound guided biopsy is recommended.  BIRADS 4 \par \par 5/3/23 - underwent LEFT Breast, 2:00, 4 cm FN Biopsy:  Pathology - DCIS, cribriform and papillary type, with intermediate nuclear grade.  Largest contiguous focus is 4 mm.  ER + (> 95%) PA + (>95%) \par \par 5/11/23 - Breast MRI:  Patient has newly diagnosed DCIS in the 2:00 axis of the left breast. There is a 6 mm enhancing nodule with clip in place in the upper outer quadrant consistent with post biopsy changes and/or residual disease.  There is no MRI evidence for multifocal or multicentric disease.  No suspicious findings seen in the right breast.  Surgical and oncologic consultations are advised.  BIRADS 6\par \par 6/6/23 - underwent LEFT Lumpectomy with Dr. Stahl (surgeon):  Pathology -  DCIS (at least 8 mm), intermediate nuclear grade, cribriform type, with focal necrosis, also involving an intraductal papilloma.  Margins of the specimen are free of tumor. Closest Margin Greater than 5 mm. Fibrocystic changes. Calcifications within benign duct epithelium. Dense stromal fibrosis. Prior biopsy site changes.  \par Oncotype DX Breast DCIS Score: 8\par \par 7/14/23 - saw Dr. Gifford ([facility]) in consultation .... recommended endocrine therapy with Anastrozole, to start after radiation therapy if radiation is recommended.   \par \par 7/28/23 - presents in consultation for discussion of radiation options.  Ms. Reynoso is feeling well today and has healed from her surgery.  Denies any pain.  She looks forward to next steps in her treatment.

## 2023-07-28 NOTE — VITALS
[Maximal Pain Intensity: 0/10] : 0/10 [Least Pain Intensity: 0/10] : 0/10 [NoTreatment Scheduled] : no treatment scheduled [90: Able to carry normal activity; minor signs or symptoms of disease.] : 90: Able to carry normal activity; minor signs or symptoms of disease.  [ECOG Performance Status: 0 - Fully active, able to carry on all pre-disease performance without restriction] : Performance Status: 0 - Fully active, able to carry on all pre-disease performance without restriction [Date: ____________] : Patient's last distress assessment performed on [unfilled]. [5 - Distress Level] : Distress Level: 5 [Patient given social work contact information and resource sheet] : Patient was given social work contact information and resource sheet

## 2023-07-28 NOTE — PHYSICAL EXAM
[Sclera] : the sclera and conjunctiva were normal [Extraocular Movements] : extraocular movements were intact [Outer Ear] : the ears and nose were normal in appearance [Hearing Threshold Finger Rub Not [place]] : hearing was normal [Breast Palpation Mass] : no palpable masses [Breast Abnormal Lactation (Galactorrhea)] : no nipple discharge [No UE Edema] : there is no upper extremity edema [Abdomen Soft] : soft [Nondistended] : nondistended [Abdomen Tenderness] : non-tender [Motor Exam] : the motor exam was normal [Normal] : no palpable adenopathy [de-identified] : left lateral breast scar healed well

## 2023-08-02 ENCOUNTER — APPOINTMENT (OUTPATIENT)
Dept: OBGYN | Facility: CLINIC | Age: 68
End: 2023-08-02
Payer: COMMERCIAL

## 2023-08-02 PROCEDURE — 76856 US EXAM PELVIC COMPLETE: CPT | Mod: 59

## 2023-08-02 PROCEDURE — 76830 TRANSVAGINAL US NON-OB: CPT

## 2023-08-03 ENCOUNTER — NON-APPOINTMENT (OUTPATIENT)
Age: 68
End: 2023-08-03

## 2023-08-04 ENCOUNTER — NON-APPOINTMENT (OUTPATIENT)
Age: 68
End: 2023-08-04

## 2023-08-09 PROCEDURE — 77263 THER RADIOLOGY TX PLNG CPLX: CPT

## 2023-08-11 ENCOUNTER — NON-APPOINTMENT (OUTPATIENT)
Age: 68
End: 2023-08-11

## 2023-08-11 PROCEDURE — 77333 RADIATION TREATMENT AID(S): CPT | Mod: 26

## 2023-08-11 PROCEDURE — 77290 THER RAD SIMULAJ FIELD CPLX: CPT | Mod: 26

## 2023-08-17 PROCEDURE — 77334 RADIATION TREATMENT AID(S): CPT | Mod: 26

## 2023-08-17 PROCEDURE — 77295 3-D RADIOTHERAPY PLAN: CPT | Mod: 26

## 2023-08-17 PROCEDURE — 77300 RADIATION THERAPY DOSE PLAN: CPT | Mod: 26

## 2023-08-18 ENCOUNTER — APPOINTMENT (OUTPATIENT)
Dept: INFUSION THERAPY | Facility: HOSPITAL | Age: 68
End: 2023-08-18

## 2023-08-21 DIAGNOSIS — M81.0 AGE-RELATED OSTEOPOROSIS WITHOUT CURRENT PATHOLOGICAL FRACTURE: ICD-10-CM

## 2023-08-22 PROCEDURE — 77280 THER RAD SIMULAJ FIELD SMPL: CPT | Mod: 26

## 2023-08-23 PROCEDURE — G6017: CPT

## 2023-08-24 PROCEDURE — G6017: CPT

## 2023-08-25 PROCEDURE — G6017: CPT

## 2023-08-28 DIAGNOSIS — Z92.3 PERSONAL HISTORY OF IRRADIATION: ICD-10-CM

## 2023-08-28 PROCEDURE — G6017: CPT

## 2023-08-28 NOTE — DISEASE MANAGEMENT
[Pathological] : TNM Stage: p [0] : 0 [TTNM] : is [NTNM] : x [MTNM] : x [de-identified] : 1060cGy [de-identified] : 3987iCx [de-identified] : LEFT Breast

## 2023-08-28 NOTE — HISTORY OF PRESENT ILLNESS
[FreeTextEntry1] : Ms. Reynoso is a 67-year-old female who presents for an on treatment visit. She is accompanied by her  for today's visit.   Diagnosis: pTis LEFT Breast, DCIS (at least 8 mm), intermediate nuclear grade, cribriform type, with focal necrosis, also involving an intraductal papilloma.  Margins of the specimen are free of tumor.   ER + (> 95%) NV + (>95%)  Oncotype DX Breast DCIS Score: 8  HPI :  5/1/23 - Screening Mammogram:  Interval development of mild asymmetry seen in the upper outer quadrant of the left breast.  Bilateral US: Interval development of a 6 mm hypoechoic nodule in the 2:00 position of the left breast.  Ultrasound guided biopsy is recommended.  BIRADS 4   5/3/23 - underwent LEFT Breast, 2:00, 4 cm FN Biopsy:  Pathology - DCIS, cribriform and papillary type, with intermediate nuclear grade.  Largest contiguous focus is 4 mm.  ER + (> 95%) NV + (>95%)   5/11/23 - Breast MRI:  Patient has newly diagnosed DCIS in the 2:00 axis of the left breast. There is a 6 mm enhancing nodule with clip in place in the upper outer quadrant consistent with post biopsy changes and/or residual disease.  There is no MRI evidence for multifocal or multicentric disease.  No suspicious findings seen in the right breast.  Surgical and oncologic consultations are advised.  BIRADS 6  6/6/23 - underwent LEFT Lumpectomy with Dr. Stahl (surgeon):  Pathology - DCIS (at least 8 mm), intermediate nuclear grade, cribriform type, with focal necrosis, also involving an intraductal papilloma.  Margins of the specimen are free of tumor. Closest Margin Greater than 5 mm. Fibrocystic changes. Calcifications within benign duct epithelium. Dense stromal fibrosis. Prior biopsy site changes.   Oncotype DX Breast DCIS Score: 8  7/14/23 - saw Dr. Gifford ([facility]) in consultation .... recommended endocrine therapy with Anastrozole, to start after radiation therapy if radiation is recommended.     7/28/23 - presented in consultation for discussion of radiation options.  Ms. Reynoso is feeling well today and has healed from her surgery.  Denies any pain.  She looks forward to next steps in her treatment.  Discussed with her and her  that she has DCIS with favorable features. Options include omission of RT; whole breast RT and partial breast RT. Radiation would be associated with improved local control but does not improve of overall survival. We have discussed the common and rarer acute and late side effects of adjuvant breast radiation as well of logistics of treatment, skin care and wellness during treatment. She has had written information to take away and will discuss with family and call back once she has decided how she would like to proceed. She has signed consent for radiation currently.  8/23/23 - started on RADIATION Therapy to LEFT Breast, 4240 cGy in 16 fx   8/28/23 - OTV 4/16 fx completed. Ms. Reynoso is feeling well and is tolerating treatment.  Reinforced what to expect with radiation therapy, possible side effects, as well as importance of skin care.  She is moisturizing at least twice a day with CeraVe and Eucerin.

## 2023-08-28 NOTE — REVIEW OF SYSTEMS
[Fatigue: Grade 0] : Fatigue: Grade 0 [Breast Pain: Grade 0] : Breast Pain: Grade 0 [Cough: Grade 0] : Cough: Grade 0 [Dyspnea: Grade 0] : Dyspnea: Grade 0 [Pruritus: Grade 0] : Pruritus: Grade 0 [Skin Hyperpigmentation: Grade 0] : Skin Hyperpigmentation: Grade 0 [Dermatitis Radiation: Grade 0] : Dermatitis Radiation: Grade 0 [FreeTextEntry4] : moisturizing skin at least twice daily  [FreeTextEntry6] : mild pinkness of skin

## 2023-08-29 PROCEDURE — 77427 RADIATION TX MANAGEMENT X5: CPT

## 2023-08-29 PROCEDURE — G6017: CPT

## 2023-08-30 PROCEDURE — G6017: CPT

## 2023-08-31 PROCEDURE — G6017: CPT

## 2023-09-01 PROCEDURE — G6017: CPT

## 2023-09-05 ENCOUNTER — NON-APPOINTMENT (OUTPATIENT)
Age: 68
End: 2023-09-05

## 2023-09-05 PROCEDURE — G6017: CPT

## 2023-09-05 NOTE — DISEASE MANAGEMENT
[Pathological] : TNM Stage: p [0] : 0 [TTNM] : is [NTNM] : x [MTNM] : x [de-identified] : 5771aRa [de-identified] : [id number] [de-identified] : LEFT Breast

## 2023-09-05 NOTE — HISTORY OF PRESENT ILLNESS
[FreeTextEntry1] : Ms. Reynoso is a 67-year-old female who presents for an on treatment visit.    Diagnosis: pTis LEFT Breast, DCIS (at least 8 mm), intermediate nuclear grade, cribriform type, with focal necrosis, also involving an intraductal papilloma.  Margins of the specimen are free of tumor.   ER + (> 95%) WA + (>95%)  Oncotype DX Breast DCIS Score: 8  HPI :  5/1/23 - Screening Mammogram:  Interval development of mild asymmetry seen in the upper outer quadrant of the left breast.  Bilateral US: Interval development of a 6 mm hypoechoic nodule in the 2:00 position of the left breast.  Ultrasound guided biopsy is recommended.  BIRADS 4   5/3/23 - underwent LEFT Breast, 2:00, 4 cm FN Biopsy:  Pathology - DCIS, cribriform and papillary type, with intermediate nuclear grade.  Largest contiguous focus is 4 mm.  ER + (> 95%) WA + (>95%)   5/11/23 - Breast MRI:  Patient has newly diagnosed DCIS in the 2:00 axis of the left breast. There is a 6 mm enhancing nodule with clip in place in the upper outer quadrant consistent with post biopsy changes and/or residual disease.  There is no MRI evidence for multifocal or multicentric disease.  No suspicious findings seen in the right breast.  Surgical and oncologic consultations are advised.  BIRADS 6  6/6/23 - underwent LEFT Lumpectomy with Dr. Stahl (surgeon):  Pathology - DCIS (at least 8 mm), intermediate nuclear grade, cribriform type, with focal necrosis, also involving an intraductal papilloma.  Margins of the specimen are free of tumor. Closest Margin Greater than 5 mm. Fibrocystic changes. Calcifications within benign duct epithelium. Dense stromal fibrosis. Prior biopsy site changes.   Oncotype DX Breast DCIS Score: 8  7/14/23 - saw Dr. Gifford ([facility]) in consultation .... recommended endocrine therapy with Anastrozole, to start after radiation therapy if radiation is recommended.     7/28/23 - presented in consultation for discussion of radiation options.  Ms. Reynoso is feeling well today and has healed from her surgery.  Denies any pain.  She looks forward to next steps in her treatment.  Discussed with her and her  that she has DCIS with favorable features. Options include omission of RT; whole breast RT and partial breast RT. Radiation would be associated with improved local control but does not improve of overall survival. We have discussed the common and rarer acute and late side effects of adjuvant breast radiation as well of logistics of treatment, skin care and wellness during treatment. She has had written information to take away and will discuss with family and call back once she has decided how she would like to proceed. She has signed consent for radiation currently.  8/23/23 - started on RADIATION Therapy to LEFT Breast, 4240 cGy in 16 fx   8/28/23 - OTV 4/16 fx completed. Ms. Reynoso is feeling well and is tolerating treatment.  Reinforced what to expect with radiation therapy, possible side effects, as well as importance of skin care.  She is moisturizing at least twice a day with CeraVe and Eucerin.   9/5/23 - OTV 9/16 fx completed.

## 2023-09-05 NOTE — REVIEW OF SYSTEMS
[Fatigue: Grade 0] : Fatigue: Grade 0 [Breast Pain: Grade 0] : Breast Pain: Grade 0 [Cough: Grade 0] : Cough: Grade 0 [Dyspnea: Grade 0] : Dyspnea: Grade 0 [Pruritus: Grade 0] : Pruritus: Grade 0 [Skin Hyperpigmentation: Grade 0] : Skin Hyperpigmentation: Grade 0 [Dermatitis Radiation: Grade 0] : Dermatitis Radiation: Grade 0 [Skin Hyperpigmentation: Grade 1 - Hyperpigmentation covering <10% BSA; no psychosocial impact] : Skin Hyperpigmentation: Grade 1 - Hyperpigmentation covering <10% BSA; no psychosocial impact [Dermatitis Radiation: Grade 1 - Faint erythema or dry desquamation] : Dermatitis Radiation: Grade 1 - Faint erythema or dry desquamation [FreeTextEntry4] : moisturizing skin at least twice daily  [FreeTextEntry6] : mild pinkness of skin

## 2023-09-06 PROCEDURE — 77427 RADIATION TX MANAGEMENT X5: CPT

## 2023-09-06 PROCEDURE — G6017: CPT

## 2023-09-07 ENCOUNTER — NON-APPOINTMENT (OUTPATIENT)
Age: 68
End: 2023-09-07

## 2023-09-07 PROCEDURE — G6017: CPT

## 2023-09-08 PROCEDURE — G6017: CPT

## 2023-09-10 ENCOUNTER — NON-APPOINTMENT (OUTPATIENT)
Age: 68
End: 2023-09-10

## 2023-09-11 PROCEDURE — G6017: CPT

## 2023-09-12 PROCEDURE — G6017: CPT

## 2023-09-12 PROCEDURE — 77427 RADIATION TX MANAGEMENT X5: CPT

## 2023-09-13 PROCEDURE — G6017: CPT

## 2023-09-14 PROCEDURE — G6017: CPT

## 2023-09-26 ENCOUNTER — NON-APPOINTMENT (OUTPATIENT)
Age: 68
End: 2023-09-26

## 2023-10-01 PROBLEM — Z92.3 HISTORY OF RADIATION THERAPY: Status: RESOLVED | Noted: 2023-08-28 | Resolved: 2023-10-01

## 2023-10-23 ENCOUNTER — APPOINTMENT (OUTPATIENT)
Dept: RADIATION ONCOLOGY | Facility: CLINIC | Age: 68
End: 2023-10-23
Payer: COMMERCIAL

## 2023-10-23 VITALS
OXYGEN SATURATION: 99 % | TEMPERATURE: 97.7 F | BODY MASS INDEX: 23.26 KG/M2 | HEART RATE: 74 BPM | RESPIRATION RATE: 17 BRPM | DIASTOLIC BLOOD PRESSURE: 76 MMHG | WEIGHT: 123.13 LBS | SYSTOLIC BLOOD PRESSURE: 116 MMHG

## 2023-10-23 PROCEDURE — 99024 POSTOP FOLLOW-UP VISIT: CPT

## 2023-10-25 ENCOUNTER — OUTPATIENT (OUTPATIENT)
Dept: OUTPATIENT SERVICES | Facility: HOSPITAL | Age: 68
LOS: 1 days | Discharge: HOME | End: 2023-10-25

## 2023-10-25 DIAGNOSIS — Z98.890 OTHER SPECIFIED POSTPROCEDURAL STATES: Chronic | ICD-10-CM

## 2023-10-25 DIAGNOSIS — C50.919 MALIGNANT NEOPLASM OF UNSPECIFIED SITE OF UNSPECIFIED FEMALE BREAST: ICD-10-CM

## 2023-10-27 ENCOUNTER — APPOINTMENT (OUTPATIENT)
Dept: HEMATOLOGY ONCOLOGY | Facility: CLINIC | Age: 68
End: 2023-10-27
Payer: COMMERCIAL

## 2023-10-27 VITALS
OXYGEN SATURATION: 93 % | HEART RATE: 69 BPM | RESPIRATION RATE: 16 BRPM | WEIGHT: 123.46 LBS | BODY MASS INDEX: 23.33 KG/M2 | TEMPERATURE: 97 F | SYSTOLIC BLOOD PRESSURE: 132 MMHG | DIASTOLIC BLOOD PRESSURE: 77 MMHG

## 2023-10-27 PROCEDURE — 99214 OFFICE O/P EST MOD 30 MIN: CPT

## 2023-11-27 ENCOUNTER — APPOINTMENT (OUTPATIENT)
Dept: SURGICAL ONCOLOGY | Facility: CLINIC | Age: 68
End: 2023-11-27
Payer: COMMERCIAL

## 2023-11-27 VITALS
DIASTOLIC BLOOD PRESSURE: 70 MMHG | HEART RATE: 73 BPM | HEIGHT: 61 IN | SYSTOLIC BLOOD PRESSURE: 130 MMHG | BODY MASS INDEX: 23.22 KG/M2 | OXYGEN SATURATION: 99 % | WEIGHT: 123 LBS

## 2023-11-27 PROCEDURE — 99214 OFFICE O/P EST MOD 30 MIN: CPT

## 2023-12-06 ENCOUNTER — NON-APPOINTMENT (OUTPATIENT)
Age: 68
End: 2023-12-06

## 2024-01-01 NOTE — ASU DISCHARGE PLAN (ADULT/PEDIATRIC). - PATIENT/GUARDIAN NAME (SIGNATURE): ____________________________________
"  Preoperative Diagnosis: Desires Circumcision.    Postop Diagnosis:  Same.      Circumcision  Date/Time: 2024   08:40 EDT  Performed by: Cristy Brown MD  Consent: Verbal consent obtained. Written consent obtained.  Risks and benefits: risks, benefits and alternatives were discussed  Consent given by: parent  Patient identity confirmed: arm band  Time out: Immediately prior to procedure a \"time out\" was called to verify the correct patient, procedure, equipment, support staff and site/side marked as required.  Anatomy: penis normal  Restraint: standard molded circumcision board  Pain Management: 1 mL 1% lidocaine  Clamp(s) used:  Goo 1.1  Complications? None  Comments: EBL minimal.  PROCEDURE: Informed consent was verified and consent form signed.  Normal anatomy was confirmed.  The penis was prepped and draped in usual fashion.  Using a 25-gauge needle and 0.8 mL's of 1% plain lidocaine, a dorsal nerve block was placed. The opening of foreskin was grasped at 3 and 9 o'clock position with curved hemostats and the foreskin bluntly  from the glans. The foreskin was clamped along the midline with a straight hemostat and then incised with scissors.  The remaining adhesions to the glans were bluntly divided. The circumcision clamp was then placed and the foreskin excised with the scalpel. After approximately one minute the clamp was removed, the foreskin was retracted and good hemostasis was noted. The infant tolerated the procedure well.  There were no complications.    " Statement Selected

## 2024-01-30 ENCOUNTER — OFFICE (OUTPATIENT)
Dept: URBAN - METROPOLITAN AREA CLINIC 109 | Facility: CLINIC | Age: 69
Setting detail: OPHTHALMOLOGY
End: 2024-01-30
Payer: COMMERCIAL

## 2024-01-30 DIAGNOSIS — G43.109: ICD-10-CM

## 2024-01-30 DIAGNOSIS — H43.393: ICD-10-CM

## 2024-01-30 DIAGNOSIS — H02.822: ICD-10-CM

## 2024-01-30 DIAGNOSIS — H16.221: ICD-10-CM

## 2024-01-30 DIAGNOSIS — H35.413: ICD-10-CM

## 2024-01-30 DIAGNOSIS — H25.13: ICD-10-CM

## 2024-01-30 PROCEDURE — 92250 FUNDUS PHOTOGRAPHY W/I&R: CPT | Performed by: OPHTHALMOLOGY

## 2024-01-30 PROCEDURE — 92014 COMPRE OPH EXAM EST PT 1/>: CPT | Performed by: OPHTHALMOLOGY

## 2024-01-30 ASSESSMENT — REFRACTION_AUTOREFRACTION
OD_SPHERE: -2.50
OD_CYLINDER: -0.75
OD_AXIS: 57
OS_SPHERE: -4.50
OS_AXIS: 165
OS_CYLINDER: -0.75

## 2024-01-30 ASSESSMENT — REFRACTION_MANIFEST
OS_AXIS: 160
OS_CYLINDER: -0.50
OD_AXIS: 50
OS_SPHERE: -5.00
OD_CYLINDER: -0.75
OD_SPHERE: -3.00

## 2024-01-30 ASSESSMENT — REFRACTION_CURRENTRX
OD_CYLINDER: -0.75
OD_OVR_VA: 20/
OS_CYLINDER: -0.50
OS_OVR_VA: 20/
OD_SPHERE: -3.00
OD_AXIS: 50
OS_AXIS: 160
OS_SPHERE: -5.00

## 2024-01-30 ASSESSMENT — CONFRONTATIONAL VISUAL FIELD TEST (CVF)
OD_FINDINGS: FULL
OS_FINDINGS: FULL

## 2024-01-30 ASSESSMENT — SPHEQUIV_DERIVED
OD_SPHEQUIV: -2.875
OS_SPHEQUIV: -4.875
OD_SPHEQUIV: -3.375
OS_SPHEQUIV: -5.25

## 2024-01-30 ASSESSMENT — SUPERFICIAL PUNCTATE KERATITIS (SPK)
OD_SPK: ABSENT
OS_SPK: ABSENT

## 2024-01-30 ASSESSMENT — LID EXAM ASSESSMENTS
OD_BLEPHARITIS: ABSENT
OS_BLEPHARITIS: ABSENT

## 2024-01-30 ASSESSMENT — DRY EYES - PHYSICIAN NOTES
OD_GENERALCOMMENTS: NO ABNORMAL STAINING
OS_GENERALCOMMENTS: NO ABNORMAL STAINING

## 2024-02-09 ENCOUNTER — OUTPATIENT (OUTPATIENT)
Dept: OUTPATIENT SERVICES | Facility: HOSPITAL | Age: 69
LOS: 1 days | Discharge: HOME | End: 2024-02-09

## 2024-02-09 DIAGNOSIS — C50.919 MALIGNANT NEOPLASM OF UNSPECIFIED SITE OF UNSPECIFIED FEMALE BREAST: ICD-10-CM

## 2024-02-09 DIAGNOSIS — Z98.890 OTHER SPECIFIED POSTPROCEDURAL STATES: Chronic | ICD-10-CM

## 2024-02-13 ENCOUNTER — NON-APPOINTMENT (OUTPATIENT)
Age: 69
End: 2024-02-13

## 2024-02-26 ENCOUNTER — APPOINTMENT (OUTPATIENT)
Dept: INFUSION THERAPY | Facility: HOSPITAL | Age: 69
End: 2024-02-26

## 2024-02-26 ENCOUNTER — APPOINTMENT (OUTPATIENT)
Dept: OTOLARYNGOLOGY | Facility: CLINIC | Age: 69
End: 2024-02-26
Payer: COMMERCIAL

## 2024-02-26 DIAGNOSIS — H90.3 SENSORINEURAL HEARING LOSS, BILATERAL: ICD-10-CM

## 2024-02-26 PROCEDURE — 99214 OFFICE O/P EST MOD 30 MIN: CPT

## 2024-02-26 PROCEDURE — 92557 COMPREHENSIVE HEARING TEST: CPT

## 2024-02-26 PROCEDURE — 92567 TYMPANOMETRY: CPT

## 2024-02-26 RX ORDER — ROSUVASTATIN CALCIUM 5 MG/1
TABLET, FILM COATED ORAL
Refills: 0 | Status: ACTIVE | COMMUNITY

## 2024-02-27 DIAGNOSIS — M81.0 AGE-RELATED OSTEOPOROSIS WITHOUT CURRENT PATHOLOGICAL FRACTURE: ICD-10-CM

## 2024-02-27 PROBLEM — H90.3 ASNHL (ASYMMETRICAL SENSORINEURAL HEARING LOSS): Status: ACTIVE | Noted: 2018-03-12

## 2024-02-27 NOTE — DATA REVIEWED
[de-identified] : Right - severe rising to a moderate mixed hearing loss  Left - profound rising to a severe mixed hearing loss  Impedance testing reveals normal Type A tympanograms bilaterally

## 2024-02-27 NOTE — REASON FOR VISIT
[Subsequent Evaluation] : a subsequent evaluation for [FreeTextEntry2] : asymmetrical mixed hearing loss

## 2024-02-27 NOTE — HISTORY OF PRESENT ILLNESS
[de-identified] : 68 year old woman, annual follow up for stable asymmetrical mixed hearing loss. History of wearing hearing aids. Patient states hearing is "ok," would like to buy new hearing aids - has had current set for 4 years, not much change - would like to explore other options. Right ear is better - intermittent Left tinnitus when walking. Patient denies otalgia, otorrhea, dizziness, vertigo, headaches related to hearing, recent fevers and ear infections.

## 2024-03-07 ENCOUNTER — OFFICE (OUTPATIENT)
Dept: URBAN - METROPOLITAN AREA CLINIC 109 | Facility: CLINIC | Age: 69
Setting detail: OPHTHALMOLOGY
End: 2024-03-07
Payer: COMMERCIAL

## 2024-03-07 DIAGNOSIS — H02.822: ICD-10-CM

## 2024-03-07 DIAGNOSIS — H02.403: ICD-10-CM

## 2024-03-07 PROCEDURE — 92285 EXTERNAL OCULAR PHOTOGRAPHY: CPT | Performed by: OPHTHALMOLOGY

## 2024-03-07 PROCEDURE — 99213 OFFICE O/P EST LOW 20 MIN: CPT | Performed by: OPHTHALMOLOGY

## 2024-03-07 ASSESSMENT — REFRACTION_CURRENTRX
OD_OVR_VA: 20/
OS_AXIS: 160
OD_SPHERE: -3.00
OS_SPHERE: -5.00
OS_CYLINDER: -0.50
OD_AXIS: 50
OD_CYLINDER: -0.75
OS_OVR_VA: 20/

## 2024-03-07 ASSESSMENT — LID POSITION - PTOSIS
OD_PTOSIS: RUL 2+
OS_PTOSIS: LUL 2+

## 2024-03-07 ASSESSMENT — LID EXAM ASSESSMENTS
OS_BLEPHARITIS: ABSENT
OD_BLEPHARITIS: ABSENT

## 2024-03-08 ENCOUNTER — NON-APPOINTMENT (OUTPATIENT)
Age: 69
End: 2024-03-08

## 2024-03-08 PROBLEM — H02.40 PTOSIS OF EYELID, UNSPECIFIED; BOTH EYES: Status: ACTIVE | Noted: 2024-03-07

## 2024-03-27 ENCOUNTER — APPOINTMENT (OUTPATIENT)
Dept: PHARMACY | Facility: CLINIC | Age: 69
End: 2024-03-27
Payer: SELF-PAY

## 2024-03-27 PROCEDURE — V5010 ASSESSMENT FOR HEARING AID: CPT | Mod: NC

## 2024-03-29 NOTE — HISTORY OF PRESENT ILLNESS
[FreeTextEntry1] : Patient is a 68 year old female who has been followed by Dr. Mahoney for hearing loss. Patient has been wearing hearing aids for several years and reports benefit from use, but is interested in newer technology, per note. Patient denies otalgia, otorrhea, dizziness, vertigo, headaches related to hearing, recent fevers and ear infections. Most recent hearing test results show severe to moderately-severe mixed hearing loss in the right ear and profound to severe mixed hearing loss in the left ear. Patient has been medically cleared for new hearing aids.

## 2024-04-04 ENCOUNTER — OUTPATIENT (OUTPATIENT)
Dept: OUTPATIENT SERVICES | Facility: HOSPITAL | Age: 69
LOS: 1 days | Discharge: HOME | End: 2024-04-04

## 2024-04-04 DIAGNOSIS — C50.919 MALIGNANT NEOPLASM OF UNSPECIFIED SITE OF UNSPECIFIED FEMALE BREAST: ICD-10-CM

## 2024-04-04 DIAGNOSIS — Z98.890 OTHER SPECIFIED POSTPROCEDURAL STATES: Chronic | ICD-10-CM

## 2024-04-14 ENCOUNTER — NON-APPOINTMENT (OUTPATIENT)
Age: 69
End: 2024-04-14

## 2024-04-15 ENCOUNTER — APPOINTMENT (OUTPATIENT)
Dept: HEMATOLOGY ONCOLOGY | Facility: CLINIC | Age: 69
End: 2024-04-15
Payer: COMMERCIAL

## 2024-04-15 ENCOUNTER — RESULT REVIEW (OUTPATIENT)
Age: 69
End: 2024-04-15

## 2024-04-15 VITALS
SYSTOLIC BLOOD PRESSURE: 114 MMHG | RESPIRATION RATE: 17 BRPM | HEART RATE: 82 BPM | DIASTOLIC BLOOD PRESSURE: 71 MMHG | OXYGEN SATURATION: 95 % | TEMPERATURE: 98.1 F | BODY MASS INDEX: 23.38 KG/M2 | WEIGHT: 123.76 LBS

## 2024-04-15 LAB
BASOPHILS # BLD AUTO: 0.05 K/UL — SIGNIFICANT CHANGE UP (ref 0–0.2)
BASOPHILS NFR BLD AUTO: 0.6 % — SIGNIFICANT CHANGE UP (ref 0–2)
EOSINOPHIL # BLD AUTO: 0.14 K/UL — SIGNIFICANT CHANGE UP (ref 0–0.5)
EOSINOPHIL NFR BLD AUTO: 1.8 % — SIGNIFICANT CHANGE UP (ref 0–6)
HCT VFR BLD CALC: 39.6 % — SIGNIFICANT CHANGE UP (ref 34.5–45)
HGB BLD-MCNC: 13.1 G/DL — SIGNIFICANT CHANGE UP (ref 11.5–15.5)
IMM GRANULOCYTES NFR BLD AUTO: 0.3 % — SIGNIFICANT CHANGE UP (ref 0–0.9)
LYMPHOCYTES # BLD AUTO: 1.53 K/UL — SIGNIFICANT CHANGE UP (ref 1–3.3)
LYMPHOCYTES # BLD AUTO: 19.5 % — SIGNIFICANT CHANGE UP (ref 13–44)
MCHC RBC-ENTMCNC: 28.2 PG — SIGNIFICANT CHANGE UP (ref 27–34)
MCHC RBC-ENTMCNC: 33.1 G/DL — SIGNIFICANT CHANGE UP (ref 32–36)
MCV RBC AUTO: 85.2 FL — SIGNIFICANT CHANGE UP (ref 80–100)
MONOCYTES # BLD AUTO: 0.58 K/UL — SIGNIFICANT CHANGE UP (ref 0–0.9)
MONOCYTES NFR BLD AUTO: 7.4 % — SIGNIFICANT CHANGE UP (ref 2–14)
NEUTROPHILS # BLD AUTO: 5.54 K/UL — SIGNIFICANT CHANGE UP (ref 1.8–7.4)
NEUTROPHILS NFR BLD AUTO: 70.4 % — SIGNIFICANT CHANGE UP (ref 43–77)
NRBC # BLD: 0 /100 WBCS — SIGNIFICANT CHANGE UP (ref 0–0)
PLATELET # BLD AUTO: 211 K/UL — SIGNIFICANT CHANGE UP (ref 150–400)
RBC # BLD: 4.65 M/UL — SIGNIFICANT CHANGE UP (ref 3.8–5.2)
RBC # FLD: 12.4 % — SIGNIFICANT CHANGE UP (ref 10.3–14.5)
WBC # BLD: 7.86 K/UL — SIGNIFICANT CHANGE UP (ref 3.8–10.5)
WBC # FLD AUTO: 7.86 K/UL — SIGNIFICANT CHANGE UP (ref 3.8–10.5)

## 2024-04-15 PROCEDURE — G2211 COMPLEX E/M VISIT ADD ON: CPT

## 2024-04-15 PROCEDURE — 99214 OFFICE O/P EST MOD 30 MIN: CPT

## 2024-04-15 RX ORDER — ANASTROZOLE TABLETS 1 MG/1
1 TABLET ORAL DAILY
Qty: 1 | Refills: 1 | Status: ACTIVE | COMMUNITY
Start: 2023-07-14 | End: 1900-01-01

## 2024-04-16 LAB
ALBUMIN SERPL ELPH-MCNC: 4.6 G/DL
ALP BLD-CCNC: 85 U/L
ALT SERPL-CCNC: 34 U/L
ANION GAP SERPL CALC-SCNC: 13 MMOL/L
AST SERPL-CCNC: 25 U/L
BILIRUB SERPL-MCNC: 0.2 MG/DL
BUN SERPL-MCNC: 27 MG/DL
CALCIUM SERPL-MCNC: 9.7 MG/DL
CHLORIDE SERPL-SCNC: 101 MMOL/L
CO2 SERPL-SCNC: 25 MMOL/L
CREAT SERPL-MCNC: 1.24 MG/DL
EGFR: 48 ML/MIN/1.73M2
GLUCOSE SERPL-MCNC: 102 MG/DL
POTASSIUM SERPL-SCNC: 4.1 MMOL/L
PROT SERPL-MCNC: 7 G/DL
SODIUM SERPL-SCNC: 139 MMOL/L

## 2024-04-21 NOTE — ASSESSMENT
[FreeTextEntry1] : 67 year old female here for an evaluation of breast cancer with newly diagnosed DCIS, intermediate nuclear grade, ER/UT+. S/p left breast lumpectomy 6/6/23 RT completed 9/2023. AI started 10/2023  1. Breast ca- Ms. RYANN BENNETT  is tolerating AI well, good compliance. She has mild side effects from the treatment. Continue treatment x 5 yrs. Annual breast imaging reviewed 2. Osteoporosis: Concern for worsening bone density and fractures due to anastrozole. Rec to  continue calcium and vit D. DEXA 1-2 yrs. PROLIa due 8/2024 3. High cholesterol/CAD risk factors: Concern for worsening cholesterol/CAD risk factors due to anastrozole. Pt is on crestor. Lipid profile annually. Life style modifications d/w her. Father: pancreatic and prostate cancer: will discuss genetics again next visit 	 RTC 6 m

## 2024-04-21 NOTE — CONSULT LETTER
[Dear  ___] : Dear  [unfilled], [Consult Letter:] : I had the pleasure of evaluating your patient, [unfilled]. [Please see my note below.] : Please see my note below. [Consult Closing:] : Thank you very much for allowing me to participate in the care of this patient.  If you have any questions, please do not hesitate to contact me. [Sincerely,] : Sincerely, [FreeTextEntry3] : Geri Gifford MD\par  Attending Physician, Division of Medical Oncology and Hematology\par  Medical Director, Center for Cancer, Pregnancy and Reproduction\par  Medical Director, Breast Wellness and Cancer Prevention Program \par  [facility] [place]\par  [facility] [place]\par  , [facility] [facility] [employer] [facility]

## 2024-04-21 NOTE — PHYSICAL EXAM
[Fully active, able to carry on all pre-disease performance without restriction] : Status 0 - Fully active, able to carry on all pre-disease performance without restriction [Normal] : affect appropriate [de-identified] : Well healed lumpectomy scar

## 2024-04-21 NOTE — HISTORY OF PRESENT ILLNESS
[de-identified] : Ms. RYANN BENNETT is a 67 year old female here for an evaluation of breast cancer. Her oncologic history is as follows:  On 5/1/23 she completed a screening mammogram and breast ultrasound. On mammogram, there is interval development of mild asymmetry seen in the upper outer left breast. Ultrasound revealed interval development of a 6 mm nodule in the left breast at 2:00, N+4. Ultrasound-guided biopsy is recommended (BIRADS 4).  Biopsy was performed on 5/3/23 and pathology demonstrated DCIS, intermediate nuclear grade, ER/ND+.  Breast MRI 5/11/23- newly diagnosed DCIS 2:00 left breast. There is a 6 mm enhancing nodule with clip in place in the upper outer quadrant. No evidence of multifocal/multicentric or contralateral disease.   7/14/23: Patient is s/p left breast lumpectomy 6/6/23 final pathology revealed DCIS, intermediate nuclear grade, cribriform type, with focal necrosis, also involving an intraductal papilloma. Margin benign. She is feeling well overall, surgical site healing well no evidence of infection, seroma or hematoma.   PMH: noncontributory; PSH: myomectomy Family hx: Mother: Lymphoma; Father: pancreatic and prostate cancer; paternal grandmother: leukemia Social: No smoking, no drinking; works as a ; post-menopausal and has not had any HRT  [de-identified] : 67 year old female here for an evaluation of breast cancer with newly diagnosed DCIS, intermediate nuclear grade, ER/[place]+. S/p left breast lumpectomy 6/6/23 final pathology revealed DCIS, intermediate nuclear grade, cribriform type, with focal necrosis, also involving an intraductal papilloma. Margin benign. She is feeling well overall, surgical site healing well no evidence of infection, seroma or hematoma. Of note, patient's father with h/o pancreatic and prostate cancer.  RT completed 9/2023. AI started 10/2023 Takes AI daily, good compliance. She denies aches/pain, hot flashes, vag dryness, excessive fatigue, GI s/e, hair loss. She is active, no change in energy, wt or appetite.  mammogram -  11/2023 birads 3  DEXA- osteoporosis, started prolia 8/2023. s/p second dose 2/2024. She takes ca+vit D. No dental issues.  Father: pancreatic and prostate cancer: will discuss genetics again next visit

## 2024-04-22 ENCOUNTER — APPOINTMENT (OUTPATIENT)
Dept: PHARMACY | Facility: CLINIC | Age: 69
End: 2024-04-22
Payer: SELF-PAY

## 2024-04-22 PROCEDURE — V5010 ASSESSMENT FOR HEARING AID: CPT | Mod: NC

## 2024-05-23 NOTE — REVIEW OF SYSTEMS
You can access the [facility], offered by [facility], by registering with the following website: [email or web address]
[Negative] : Heme/Lymph

## 2024-05-30 ENCOUNTER — APPOINTMENT (OUTPATIENT)
Dept: SURGICAL ONCOLOGY | Facility: CLINIC | Age: 69
End: 2024-05-30
Payer: COMMERCIAL

## 2024-05-30 VITALS
BODY MASS INDEX: 23.6 KG/M2 | OXYGEN SATURATION: 98 % | SYSTOLIC BLOOD PRESSURE: 126 MMHG | HEART RATE: 73 BPM | HEIGHT: 61 IN | DIASTOLIC BLOOD PRESSURE: 74 MMHG | WEIGHT: 125 LBS

## 2024-05-30 DIAGNOSIS — D05.10 INTRADUCTAL CARCINOMA IN SITU OF UNSPECIFIED BREAST: ICD-10-CM

## 2024-05-30 PROCEDURE — 99214 OFFICE O/P EST MOD 30 MIN: CPT

## 2024-05-30 NOTE — HISTORY OF PRESENT ILLNESS
[de-identified] : Lexie is a pleasant 68 year-old female here for a follow up visit.  She has been vigilant about her yearly breast screenings and denies any prior breast history.  On 23 she completed a screening mammogram and breast ultrasound.  On mammogram, there is interval development of mild asymmetry seen in the upper outer left breast.  Ultrasound revealed interval development of a 6 mm nodule in the left breast at 2:00, N+4.  Ultrasound-guided biopsy is recommended (BIRADS 4).  Biopsy was performed on 5/3/23 and pathology demonstrated DCIS, intermediate nuclear grade, ER/NH+.  Breast MRI 23- newly diagnosed DCIS 2:00 left breast.  There is a 6 mm enhancing nodule with clip in place in the upper outer quadrant.  No evidence of multifocal/multicentric or contralateral disease.    **SURGERY** s/p left breast lumpectomy 2023 final pathology revealed DCIS, at least 8 mm, IDP, margins negative, pTis, ER+/NH+  ONCOTYPE DX= 8  PMH: GERD (on PPI) PSH: uterine myomectomy Family Hx: father + pancreatic (diagnosed 80's) & prostate cancer, mother + lymphoma Social Hx: non-smoker, denies ETOH, works in [employer] department [employer]  Established with Dr. Geri Gifford and consented to begin Anastrozole. She is pending a consultation with Dr. Marsh from radiation oncology on 23.  23- Lexie is feeling well.  Denies any pain, swelling, erythema, drainage, fever or chills.   She received adjuvant RT to the left breast 23 - 23 with Dr. Marsh.  She started Anastrozole in 2023.  Left mammogram/sonogram 2023 ([facility]) demonstrates postop changes but no evidence of malignancy, recommend 6 month follow up at the time of her annual bilateral mammo/sono due 2023 (BIRADS 3).  23- Lexie returns for routine follow up.   She denies any breast masses, nipple discharge, inversion or skin change.  She tripped over her loose shoe in the building earlier, fell and landed on her hand/wrist, has some bruising on her right forearm, did not hit her head.  States she feels sore but ok. Lexie will take NSAIDs for analgesia after her fall.  She has declined any imaging at this point.  She will contact us if she wishes additional imaging.  She will follow-up with us in 6 months upon completion of her annual mammogram and sonogram.  B/L mammo/sono (@ [facility]) 2024 - post lumpectomy scarring to L breast -> in absence of clinical suspicion, f/u L mammo/sono in 6 months BI-RADS 3   2024 - Lexie returns for ongoing follow-up, she is feeling well overall, tolerating Anastrozole under Dr. Gifford. She denies any new health issues, denies palpable breast masses, nipple discharge, skin changes, inversion or breast pain.

## 2024-05-30 NOTE — ASSESSMENT
[FreeTextEntry1] : Lexie is doing well, she will follow-up in 6 months after her left mammo/sono. She will continue close follow-up with Dr. Gifford as well.   All medical entries were at my, Dr. Federico Stahl, direction.  I have reviewed the chart and agree that the record accurately reflects my personal performance of the history, physical exam, assessment and plan.  Our office nurse practitioner was present for the duration of the office visit.

## 2024-05-30 NOTE — CONSULT LETTER
[Dear  ___] : Dear  [unfilled], [Consult Letter:] : I had the pleasure of evaluating your patient, [unfilled]. [Please see my note below.] : Please see my note below. [Consult Closing:] : Thank you very much for allowing me to participate in the care of this patient.  If you have any questions, please do not hesitate to contact me. [Sincerely,] : Sincerely, [FreeTextEntry2] : Chidi Rubalcava MD  [FreeTextEntry3] : Federico Stahl MD\par  Surgical Oncology\par  [facility]\par  Office: [phone]\par  Cell: [phone]\par

## 2024-05-30 NOTE — PHYSICAL EXAM
[Normal] : normal breast inspection and palpation of axillas [Normal Supraclavicular Lymph Nodes] : normal supraclavicular lymph nodes [Normal Axillary Lymph Nodes] : normal axillary lymph nodes [Normal] : oriented to person, place and time, with appropriate affect [de-identified] : No significant  bruising or ecchymosis.  Sensorimotor intact  right arm and leg. [FreeTextEntry1] : SS present during physical exam.  [de-identified] : well healed left breast incision

## 2024-06-11 ENCOUNTER — APPOINTMENT (OUTPATIENT)
Dept: PHARMACY | Facility: CLINIC | Age: 69
End: 2024-06-11
Payer: SELF-PAY

## 2024-06-11 PROCEDURE — V5261B: CUSTOM

## 2024-06-24 ENCOUNTER — APPOINTMENT (OUTPATIENT)
Dept: PHARMACY | Facility: CLINIC | Age: 69
End: 2024-06-24
Payer: SELF-PAY

## 2024-06-24 PROCEDURE — V5299A: CUSTOM

## 2024-07-03 ENCOUNTER — APPOINTMENT (OUTPATIENT)
Dept: PHARMACY | Facility: CLINIC | Age: 69
End: 2024-07-03

## 2024-07-03 PROCEDURE — V5299A: CUSTOM

## 2024-07-18 ENCOUNTER — APPOINTMENT (OUTPATIENT)
Dept: PHARMACY | Facility: CLINIC | Age: 69
End: 2024-07-18
Payer: SELF-PAY

## 2024-07-18 ENCOUNTER — APPOINTMENT (OUTPATIENT)
Dept: PHARMACY | Facility: CLINIC | Age: 69
End: 2024-07-18

## 2024-07-18 PROCEDURE — V5299A: CUSTOM

## 2024-07-23 ENCOUNTER — APPOINTMENT (OUTPATIENT)
Dept: PHARMACY | Facility: CLINIC | Age: 69
End: 2024-07-23
Payer: SELF-PAY

## 2024-07-23 ENCOUNTER — APPOINTMENT (OUTPATIENT)
Dept: OBGYN | Facility: CLINIC | Age: 69
End: 2024-07-23
Payer: COMMERCIAL

## 2024-07-23 PROCEDURE — 99459 PELVIC EXAMINATION: CPT

## 2024-07-23 PROCEDURE — 99397 PER PM REEVAL EST PAT 65+ YR: CPT

## 2024-07-23 PROCEDURE — V5299A: CUSTOM

## 2024-07-23 PROCEDURE — 96127 BRIEF EMOTIONAL/BEHAV ASSMT: CPT

## 2024-07-26 ENCOUNTER — OUTPATIENT (OUTPATIENT)
Dept: OUTPATIENT SERVICES | Facility: HOSPITAL | Age: 69
LOS: 1 days | Discharge: HOME | End: 2024-07-26

## 2024-07-26 DIAGNOSIS — C50.919 MALIGNANT NEOPLASM OF UNSPECIFIED SITE OF UNSPECIFIED FEMALE BREAST: ICD-10-CM

## 2024-07-26 DIAGNOSIS — Z98.890 OTHER SPECIFIED POSTPROCEDURAL STATES: Chronic | ICD-10-CM

## 2024-08-02 ENCOUNTER — APPOINTMENT (OUTPATIENT)
Dept: HEMATOLOGY ONCOLOGY | Facility: CLINIC | Age: 69
End: 2024-08-02
Payer: COMMERCIAL

## 2024-08-02 ENCOUNTER — APPOINTMENT (OUTPATIENT)
Dept: INFUSION THERAPY | Facility: HOSPITAL | Age: 69
End: 2024-08-02

## 2024-08-02 VITALS
OXYGEN SATURATION: 96 % | SYSTOLIC BLOOD PRESSURE: 127 MMHG | RESPIRATION RATE: 16 BRPM | WEIGHT: 125.66 LBS | BODY MASS INDEX: 23.74 KG/M2 | TEMPERATURE: 98 F | HEART RATE: 64 BPM | DIASTOLIC BLOOD PRESSURE: 74 MMHG

## 2024-08-02 DIAGNOSIS — D05.10 INTRADUCTAL CARCINOMA IN SITU OF UNSPECIFIED BREAST: ICD-10-CM

## 2024-08-02 DIAGNOSIS — Z79.811 ENCOUNTER FOR THERAPEUTIC DRUG LVL MONITORING: ICD-10-CM

## 2024-08-02 DIAGNOSIS — Z51.81 ENCOUNTER FOR THERAPEUTIC DRUG LVL MONITORING: ICD-10-CM

## 2024-08-02 PROCEDURE — G2211 COMPLEX E/M VISIT ADD ON: CPT

## 2024-08-02 PROCEDURE — 99214 OFFICE O/P EST MOD 30 MIN: CPT

## 2024-08-02 NOTE — HISTORY OF PRESENT ILLNESS
[de-identified] : Ms. RYANN BENNETT is a 67 year old female here for an evaluation of breast cancer. Her oncologic history is as follows:  On 5/1/23 she completed a screening mammogram and breast ultrasound. On mammogram, there is interval development of mild asymmetry seen in the upper outer left breast. Ultrasound revealed interval development of a 6 mm nodule in the left breast at 2:00, N+4. Ultrasound-guided biopsy is recommended (BIRADS 4).  Biopsy was performed on 5/3/23 and pathology demonstrated DCIS, intermediate nuclear grade, ER/WY+.  Breast MRI 5/11/23- newly diagnosed DCIS 2:00 left breast. There is a 6 mm enhancing nodule with clip in place in the upper outer quadrant. No evidence of multifocal/multicentric or contralateral disease.   7/14/23: Patient is s/p left breast lumpectomy 6/6/23 final pathology revealed DCIS, intermediate nuclear grade, cribriform type, with focal necrosis, also involving an intraductal papilloma. Margin benign. She is feeling well overall, surgical site healing well no evidence of infection, seroma or hematoma.   PMH: noncontributory; PSH: myomectomy Family hx: Mother: Lymphoma; Father: pancreatic and prostate cancer; paternal grandmother: leukemia Social: No smoking, no drinking; works as a ; post-menopausal and has not had any HRT  [de-identified] : 67 year old female here for an evaluation of breast cancer with newly diagnosed DCIS, intermediate nuclear grade, ER/DC+. S/p left breast lumpectomy 6/6/23 final pathology revealed DCIS, intermediate nuclear grade, cribriform type, with focal necrosis, also involving an intraductal papilloma. Margin benign. She is feeling well overall, surgical site healing well no evidence of infection, seroma or hematoma. Of note, patient's father with h/o pancreatic and prostate cancer.  RT completed 9/2023. AI started 10/2023 Takes AI daily, good compliance. She denies aches/pain, hot flashes, vag dryness, excessive fatigue, GI s/e, hair loss. She is active, no change in energy, wt or appetite.  mammogram -  11/2023 birads 3  DEXA- osteoporosis, started prolia 8/2023. s/p second dose 2/2024. She takes ca+vit D. No dental issues.  Father: pancreatic and prostate cancer: will discuss genetics again next visit  8/2/2024 Patient presents today for follow-up. She is currently taking Anastrozole with good efficacy. She denies any s/e to include; arthralgias, hair thinning, vaginal dryness, GI se/, SOB, or palpitations.  Breast Health: Bilateral Screening Mammography & Bilateral Complete US completed at [facility] on 5/14/2014; BI-RADS 3 Imaging ordered and managed by Dr. Stahl. Recommended Imaging ordered for 11/2. Breast MRI scheduled for 11/20/24. Patient to be seen in December 2024.  Bone Health: Last DEXA Bone Imaging completed on 28 July 2023; imaging demonstrated Osteoporosis at the Femoral Neck (-3.0). Prolia initiated 8/23. Received her last injection today. Recommended to repeat imaging every 1-2 years. Denies any dental issues and continues to take both Calcium and Vitamin D.  Genetics: patient report she will speak to her  and contact the office if she wishes to be tested. RTO: 6 months

## 2024-08-02 NOTE — CONSULT LETTER
[Dear  ___] : Dear  [unfilled], [Consult Letter:] : I had the pleasure of evaluating your patient, [unfilled]. [Please see my note below.] : Please see my note below. [Consult Closing:] : Thank you very much for allowing me to participate in the care of this patient.  If you have any questions, please do not hesitate to contact me. [Sincerely,] : Sincerely, [FreeTextEntry3] : Geri Gifford MD\par  Attending Physician, Division of Medical Oncology and Hematology\par  Medical Director, Center for Cancer, Pregnancy and Reproduction\par  Medical Director, Breast Wellness and Cancer Prevention Program \par  [facility] [place]\par  [facility] [place]\par  , [employer] [facility] [employer] [facility]

## 2024-08-02 NOTE — HISTORY OF PRESENT ILLNESS
"Subjective:         Chief Complaint: neck tension (Feel lump front of neck. Concerned/rule out goiter) and Temporomandibular Joint Pain    HPI  Mr. Ehsan Buckley is a 42 yo M presenting for evaluation as below:    "neck tension":   -describing anterior neck discomfort (particularly after eating and bilateral neck discomfort subjectively related to stress  -recently evaluated via ENT via CT confirming left neck lymph node without any concerning findings; monitoring was recommended with re-evaluation in the next 3-4 months (as of 12/23; has follow-up in 2 months) with preceding/reassuring thyroid ultrasound results    Review of Systems   Constitutional:  Negative for appetite change, chills and fever.   HENT: Negative.     Respiratory:  Negative for cough, chest tightness and shortness of breath.    Cardiovascular:  Negative for chest pain, palpitations and leg swelling.   Gastrointestinal:  Negative for abdominal distention, abdominal pain, blood in stool, constipation, diarrhea, nausea and vomiting.   Endocrine: Negative.    Genitourinary:  Negative for difficulty urinating, dysuria, frequency and hematuria.   Musculoskeletal:  Positive for neck pain.   Integumentary:  Negative.   Neurological: Negative.    Psychiatric/Behavioral: Negative.           Objective:      Vitals:    05/16/24 1451   BP: 138/86   Pulse: 65   Resp: 16   Temp: 98 °F (36.7 °C)      Physical Exam  Vitals reviewed.   Constitutional:       General: He is not in acute distress.     Appearance: Normal appearance.   HENT:      Head: Normocephalic and atraumatic.      Comments: Facial features are symmetric      Nose: Nose normal. No congestion or rhinorrhea.      Mouth/Throat:      Mouth: Mucous membranes are moist.      Pharynx: Oropharynx is clear. No oropharyngeal exudate or posterior oropharyngeal erythema.   Eyes:      General: No scleral icterus.     Extraocular Movements: Extraocular movements intact.      Conjunctiva/sclera: Conjunctivae " normal.   Cardiovascular:      Rate and Rhythm: Normal rate and regular rhythm.      Pulses: Normal pulses.      Heart sounds: Normal heart sounds.   Pulmonary:      Effort: Pulmonary effort is normal. No respiratory distress.      Breath sounds: Normal breath sounds.   Musculoskeletal:         General: No deformity or signs of injury. Normal range of motion.      Cervical back: Normal range of motion.      Comments: Gait normal    Skin:     General: Skin is warm and dry.      Findings: No rash.   Neurological:      General: No focal deficit present.      Mental Status: He is alert and oriented to person, place, and time. Mental status is at baseline.   Psychiatric:         Mood and Affect: Mood normal.         Behavior: Behavior normal.         Thought Content: Thought content normal.       No current outpatient medications on file prior to visit.     No current facility-administered medications on file prior to visit.         Assessment:       1. Chronic neck pain        Plan:       Chronic neck pain  -     Ambulatory referral/consult to Physical/Occupational Therapy; Future; Expected date: 05/23/2024   - myofascial sources favored for which physical therapy establishment will be facilitated   - follow-up p.r.n.              [de-identified] : Ms. RYANN BENNETT is a 67 year old female here for an evaluation of breast cancer. Her oncologic history is as follows:  On 5/1/23 she completed a screening mammogram and breast ultrasound. On mammogram, there is interval development of mild asymmetry seen in the upper outer left breast. Ultrasound revealed interval development of a 6 mm nodule in the left breast at 2:00, N+4. Ultrasound-guided biopsy is recommended (BIRADS 4).  Biopsy was performed on 5/3/23 and pathology demonstrated DCIS, intermediate nuclear grade, ER/TN+.  Breast MRI 5/11/23- newly diagnosed DCIS 2:00 left breast. There is a 6 mm enhancing nodule with clip in place in the upper outer quadrant. No evidence of multifocal/multicentric or contralateral disease.   7/14/23: Patient is s/p left breast lumpectomy 6/6/23 final pathology revealed DCIS, intermediate nuclear grade, cribriform type, with focal necrosis, also involving an intraductal papilloma. Margin benign. She is feeling well overall, surgical site healing well no evidence of infection, seroma or hematoma.   PMH: noncontributory; PSH: myomectomy Family hx: Mother: Lymphoma; Father: pancreatic and prostate cancer; paternal grandmother: leukemia Social: No smoking, no drinking; works as a ; post-menopausal and has not had any HRT  [de-identified] : 67 year old female here for an evaluation of breast cancer with newly diagnosed DCIS, intermediate nuclear grade, ER/NE+. S/p left breast lumpectomy 6/6/23 final pathology revealed DCIS, intermediate nuclear grade, cribriform type, with focal necrosis, also involving an intraductal papilloma. Margin benign. She is feeling well overall, surgical site healing well no evidence of infection, seroma or hematoma. Of note, patient's father with h/o pancreatic and prostate cancer.  RT completed 9/2023. AI started 10/2023 Takes AI daily, good compliance. She denies aches/pain, hot flashes, vag dryness, excessive fatigue, GI s/e, hair loss. She is active, no change in energy, wt or appetite.  mammogram -  11/2023 birads 3  DEXA- osteoporosis, started prolia 8/2023. s/p second dose 2/2024. She takes ca+vit D. No dental issues.  Father: pancreatic and prostate cancer: will discuss genetics again next visit  8/2/2024 Patient presents today for follow-up. She is currently taking Anastrozole with good efficacy. She denies any s/e to include; arthralgias, hair thinning, vaginal dryness, GI se/, SOB, or palpitations.  Breast Health: Bilateral Screening Mammography & Bilateral Complete US completed at [facility] on 5/14/2014; BI-RADS 3 Imaging ordered and managed by Dr. Stahl. Recommended Imaging ordered for 11/2. Breast MRI scheduled for 11/20/24. Patient to be seen in December 2024.  Bone Health: Last DEXA Bone Imaging completed on 28 July 2023; imaging demonstrated Osteoporosis at the Femoral Neck (-3.0). Prolia initiated 8/23. Received her last injection today. Recommended to repeat imaging every 1-2 years. Denies any dental issues and continues to take both Calcium and Vitamin D.  Genetics: patient report she will speak to her  and contact the office if she wishes to be tested. RTO: 6 months

## 2024-08-02 NOTE — PHYSICAL EXAM
[Fully active, able to carry on all pre-disease performance without restriction] : Status 0 - Fully active, able to carry on all pre-disease performance without restriction [Normal] : affect appropriate [de-identified] : Well healed lumpectomy scar

## 2024-08-02 NOTE — ASSESSMENT
[FreeTextEntry1] : 67 year-old female here for an evaluation of breast cancer with newly diagnosed DCIS, intermediate nuclear grade, ER/VA+. S/p left breast lumpectomy 6/6/23 RT completed 9/2023. AI started 10/2023.   1. Breast ca- Ms. RYANN REYNOSO is tolerating AI well, good compliance. She has mild side effects from the treatment. Continue treatment x 5 yrs. Annual breast imaging reviewed 2. Osteoporosis: Concern for worsening bone density and fractures due to anastrozole. Recommended to continue calcium and Vitamin D. DEXA 1-2 yrs. PROLIA due 2/2025.  3. High cholesterol/CAD risk factors: Concern for worsening cholesterol/CAD risk factors due to anastrozole. Pt is on Crestor. Lipid profile annually. Life-style modifications d/w her. 4. Genetic Testing: discussed testing with patient given personal history of Breast Cancer and her father's history of both Pancreatic and Prostate Cancer. Ms. Reynoso wishes to speak with her  and contact the office if she wishes to complete testing.  	 RTC 6 m

## 2024-08-02 NOTE — PHYSICAL EXAM
[Fully active, able to carry on all pre-disease performance without restriction] : Status 0 - Fully active, able to carry on all pre-disease performance without restriction [Normal] : affect appropriate [de-identified] : Well healed lumpectomy scar

## 2024-08-02 NOTE — BEGINNING OF VISIT
[0] : 2) Feeling down, depressed, or hopeless: Not at all (0) [[id number]] : 0 [Pain Scale: ___] : On a scale of 1-10, today the patient's pain is a(n) [unfilled]. [Never] : Never [Patient/Caregiver not ready to engage] : Patient/Caregiver not ready to engage [Abdominal Pain] : no abdominal pain [Vomiting] : no vomiting [Constipation] : no constipation [Diarrhea Character] : Diarrhea: Grade 0

## 2024-08-02 NOTE — BEGINNING OF VISIT
[0] : 2) Feeling down, depressed, or hopeless: Not at all (0) [[id number]] : 0 [Pain Scale: ___] : On a scale of 1-10, today the patient's pain is a(n) [unfilled]. [Never] : Never [Patient/Caregiver not ready to engage] : Patient/Caregiver not ready to engage [Abdominal Pain] : no abdominal pain [Vomiting] : no vomiting [Constipation] : no constipation [Diarrhea Character] : Diarrhea: Grade 0

## 2024-08-02 NOTE — CONSULT LETTER
[Dear  ___] : Dear  [unfilled], [Consult Letter:] : I had the pleasure of evaluating your patient, [unfilled]. [Please see my note below.] : Please see my note below. [Consult Closing:] : Thank you very much for allowing me to participate in the care of this patient.  If you have any questions, please do not hesitate to contact me. [Sincerely,] : Sincerely, [FreeTextEntry3] : Geri Gifford MD\par  Attending Physician, Division of Medical Oncology and Hematology\par  Medical Director, Center for Cancer, Pregnancy and Reproduction\par  Medical Director, Breast Wellness and Cancer Prevention Program \par  [facility] [place]\par  [facility] [place]\par  , [employer] [facility] [employer] [facility]

## 2024-08-02 NOTE — ASSESSMENT
[FreeTextEntry1] : 67 year-old female here for an evaluation of breast cancer with newly diagnosed DCIS, intermediate nuclear grade, ER/SD+. S/p left breast lumpectomy 6/6/23 RT completed 9/2023. AI started 10/2023.   1. Breast ca- Ms. RYANN REYNOSO is tolerating AI well, good compliance. She has mild side effects from the treatment. Continue treatment x 5 yrs. Annual breast imaging reviewed 2. Osteoporosis: Concern for worsening bone density and fractures due to anastrozole. Recommended to continue calcium and Vitamin D. DEXA 1-2 yrs. PROLIA due 2/2025.  3. High cholesterol/CAD risk factors: Concern for worsening cholesterol/CAD risk factors due to anastrozole. Pt is on Crestor. Lipid profile annually. Life-style modifications d/w her. 4. Genetic Testing: discussed testing with patient given personal history of Breast Cancer and her father's history of both Pancreatic and Prostate Cancer. Ms. Reynoso wishes to speak with her  and contact the office if she wishes to complete testing.  	 RTC 6 m

## 2024-08-05 DIAGNOSIS — M81.0 AGE-RELATED OSTEOPOROSIS WITHOUT CURRENT PATHOLOGICAL FRACTURE: ICD-10-CM

## 2024-08-08 ENCOUNTER — APPOINTMENT (OUTPATIENT)
Dept: PHARMACY | Facility: CLINIC | Age: 69
End: 2024-08-08

## 2024-08-08 PROCEDURE — V5299A: CUSTOM

## 2024-08-13 ENCOUNTER — APPOINTMENT (OUTPATIENT)
Dept: PHARMACY | Facility: CLINIC | Age: 69
End: 2024-08-13

## 2024-08-16 ENCOUNTER — APPOINTMENT (OUTPATIENT)
Dept: PHARMACY | Facility: CLINIC | Age: 69
End: 2024-08-16
Payer: SELF-PAY

## 2024-08-16 PROCEDURE — V5299A: CUSTOM

## 2024-08-21 ENCOUNTER — APPOINTMENT (OUTPATIENT)
Dept: PHARMACY | Facility: CLINIC | Age: 69
End: 2024-08-21

## 2024-10-03 ENCOUNTER — APPOINTMENT (OUTPATIENT)
Dept: PHARMACY | Facility: CLINIC | Age: 69
End: 2024-10-03

## 2024-10-03 PROCEDURE — V5299A: CUSTOM

## 2024-10-16 ENCOUNTER — APPOINTMENT (OUTPATIENT)
Dept: PHARMACY | Facility: CLINIC | Age: 69
End: 2024-10-16
Payer: SELF-PAY

## 2024-10-16 PROCEDURE — V5299A: CUSTOM

## 2024-11-11 ENCOUNTER — NON-APPOINTMENT (OUTPATIENT)
Age: 69
End: 2024-11-11

## 2024-11-22 ENCOUNTER — OUTPATIENT (OUTPATIENT)
Dept: OUTPATIENT SERVICES | Facility: HOSPITAL | Age: 69
LOS: 1 days | Discharge: HOME | End: 2024-11-22

## 2024-11-22 DIAGNOSIS — Z98.890 OTHER SPECIFIED POSTPROCEDURAL STATES: Chronic | ICD-10-CM

## 2024-11-22 DIAGNOSIS — C50.919 MALIGNANT NEOPLASM OF UNSPECIFIED SITE OF UNSPECIFIED FEMALE BREAST: ICD-10-CM

## 2024-12-02 ENCOUNTER — APPOINTMENT (OUTPATIENT)
Dept: SURGICAL ONCOLOGY | Facility: CLINIC | Age: 69
End: 2024-12-02

## 2024-12-19 ENCOUNTER — APPOINTMENT (OUTPATIENT)
Dept: SURGICAL ONCOLOGY | Facility: CLINIC | Age: 69
End: 2024-12-19
Payer: MEDICARE

## 2024-12-19 VITALS
DIASTOLIC BLOOD PRESSURE: 70 MMHG | OXYGEN SATURATION: 98 % | HEIGHT: 61 IN | SYSTOLIC BLOOD PRESSURE: 130 MMHG | BODY MASS INDEX: 23.6 KG/M2 | WEIGHT: 125 LBS | HEART RATE: 79 BPM

## 2024-12-19 DIAGNOSIS — D05.10 INTRADUCTAL CARCINOMA IN SITU OF UNSPECIFIED BREAST: ICD-10-CM

## 2024-12-19 PROCEDURE — 99204 OFFICE O/P NEW MOD 45 MIN: CPT

## 2024-12-19 NOTE — CONSULT LETTER
[Dear  ___] : Dear  [unfilled], [Consult Letter:] : I had the pleasure of evaluating your patient, [unfilled]. [Please see my note below.] : Please see my note below. [Consult Closing:] : Thank you very much for allowing me to participate in the care of this patient.  If you have any questions, please do not hesitate to contact me. [Sincerely,] : Sincerely, [FreeTextEntry2] : Ramiro Rivera MD  [FreeTextEntry3] : Evan Love MD\par  Surgical Oncology\par  [facility]\par  Office: [phone]\par  Cell: [phone]\par

## 2024-12-19 NOTE — HISTORY OF PRESENT ILLNESS
[de-identified] : Ms. TAMEKA SANZ is a 69-year-old woman here for ongoing breast follow-up.   She has been vigilant about her yearly breast screenings and denies any prior breast history.  On 23 she completed a screening mammogram and breast ultrasound.  On mammogram, there is interval development of mild asymmetry seen in the upper outer left breast.  Ultrasound revealed interval development of a 6 mm nodule in the left breast at 2:00, N+4.  Ultrasound-guided biopsy is recommended (BIRADS 4).  Biopsy was performed on 5/3/23 and pathology demonstrated DCIS, intermediate nuclear grade, ER/WY+.  Breast MRI 23- newly diagnosed DCIS 2:00 left breast.  There is a 6 mm enhancing nodule with clip in place in the upper outer quadrant.  No evidence of multifocal/multicentric or contralateral disease.    **SURGERY** s/p left breast lumpectomy 2023 final pathology revealed DCIS, at least 8 mm, IDP, margins negative, pTis, ER+/WY+  ONCOTYPE DX= 8  PMH: GERD (on PPI) PSH: uterine myomectomy Family Hx: father + pancreatic (diagnosed 80's) & prostate cancer, mother + lymphoma Social Hx: non-smoker, denies ETOH, works in [employer]  Established with Dr. Micki Merlos and consented to begin Anastrozole. She is pending a consultation with Dr. Grijalva from radiation oncology on 23.  23- Tameka is feeling well.  Denies any pain, swelling, erythema, drainage, fever or chills.   She received adjuvant RT to the left breast 23 - 23 with Dr. Grijalva.  She started Anastrozole in 2023.  Left mammogram/sonogram 2023 ([facility]) demonstrates postop changes but no evidence of malignancy, recommend 6 month follow up at the time of her annual bilateral mammo/sono due 2023 (BIRADS 3).  23- Tameka returns for routine follow up.   She denies any breast masses, nipple discharge, inversion or skin change.  She tripped over her loose shoe in the building earlier, fell and landed on her hand/wrist, has some bruising on her right forearm, did not hit her head.  States she feels sore but ok. Tameka will take NSAIDs for analgesia after her fall.  She has declined any imaging at this point.  She will contact us if she wishes additional imaging.  She will follow-up with us in 6 months upon completion of her annual mammogram and sonogram.  B/L mammo/sono (@ [facility]) 2024 - post lumpectomy scarring to L breast -> in absence of clinical suspicion, f/u L mammo/sono in 6 months BI-RADS 3   2024 - Tameka returns for ongoing follow-up, she is feeling well overall, tolerating Anastrozole under Dr. Merlos. She denies any new health issues, denies palpable breast masses, nipple discharge, skin changes, inversion or breast pain.   Left mammo/sono 2024 ([facility]) - stable post-surgical changes -> f/u in 6 months w/ annual imaging BI-RADS 3  Breast MRI 2024 - BIRADS 2  2024 - Tameka is here for ongoing breast surveillance, she denies palpable breast masses, nipple discharge, skin changes, inversion or breast pain. She remains on Anastrozole under the care of Dr. Merlos. She denies any new or worrisome health issues.

## 2024-12-19 NOTE — HISTORY OF PRESENT ILLNESS
[de-identified] : Ms. TAMEKA SANZ is a 69-year-old woman here for ongoing breast follow-up.   She has been vigilant about her yearly breast screenings and denies any prior breast history.  On 23 she completed a screening mammogram and breast ultrasound.  On mammogram, there is interval development of mild asymmetry seen in the upper outer left breast.  Ultrasound revealed interval development of a 6 mm nodule in the left breast at 2:00, N+4.  Ultrasound-guided biopsy is recommended (BIRADS 4).  Biopsy was performed on 5/3/23 and pathology demonstrated DCIS, intermediate nuclear grade, ER/MN+.  Breast MRI 23- newly diagnosed DCIS 2:00 left breast.  There is a 6 mm enhancing nodule with clip in place in the upper outer quadrant.  No evidence of multifocal/multicentric or contralateral disease.    **SURGERY** s/p left breast lumpectomy 2023 final pathology revealed DCIS, at least 8 mm, IDP, margins negative, pTis, ER+/MN+  ONCOTYPE DX= 8  PMH: GERD (on PPI) PSH: uterine myomectomy Family Hx: father + pancreatic (diagnosed 80's) & prostate cancer, mother + lymphoma Social Hx: non-smoker, denies ETOH, works in [employer]  Established with Dr. Micki Merlos and consented to begin Anastrozole. She is pending a consultation with Dr. Grijalva from radiation oncology on 23.  23- Tameka is feeling well.  Denies any pain, swelling, erythema, drainage, fever or chills.   She received adjuvant RT to the left breast 23 - 23 with Dr. Grijalva.  She started Anastrozole in 2023.  Left mammogram/sonogram 2023 ([facility]) demonstrates postop changes but no evidence of malignancy, recommend 6 month follow up at the time of her annual bilateral mammo/sono due 2023 (BIRADS 3).  23- Tameka returns for routine follow up.   She denies any breast masses, nipple discharge, inversion or skin change.  She tripped over her loose shoe in the building earlier, fell and landed on her hand/wrist, has some bruising on her right forearm, did not hit her head.  States she feels sore but ok. Tameka will take NSAIDs for analgesia after her fall.  She has declined any imaging at this point.  She will contact us if she wishes additional imaging.  She will follow-up with us in 6 months upon completion of her annual mammogram and sonogram.  B/L mammo/sono (@ [facility]) 2024 - post lumpectomy scarring to L breast -> in absence of clinical suspicion, f/u L mammo/sono in 6 months BI-RADS 3   2024 - Tameka returns for ongoing follow-up, she is feeling well overall, tolerating Anastrozole under Dr. Merlos. She denies any new health issues, denies palpable breast masses, nipple discharge, skin changes, inversion or breast pain.   Left mammo/sono 2024 ([facility]) - stable post-surgical changes -> f/u in 6 months w/ annual imaging BI-RADS 3  Breast MRI 2024 - BIRADS 2  2024 - Tameka is here for ongoing breast surveillance, she denies palpable breast masses, nipple discharge, skin changes, inversion or breast pain. She remains on Anastrozole under the care of Dr. Merlos. She denies any new or worrisome health issues.

## 2024-12-19 NOTE — PHYSICAL EXAM
[Normal] : normal breast inspection and palpation of axillas [Normal Supraclavicular Lymph Nodes] : normal supraclavicular lymph nodes [Normal Axillary Lymph Nodes] : normal axillary lymph nodes [Normal] : oriented to person, place and time, with appropriate affect [FreeTextEntry1] : SS present during physical exam.  [de-identified] : well healed left breast incision

## 2024-12-19 NOTE — ASSESSMENT
[FreeTextEntry1] : Mireya is doing well, she will follow-up in 6 months after her annual mammo/sono. She will continue close follow-up with Dr. Merlos as well.   All medical entries were at my, Dr. Evan Love, direction.  I have reviewed the chart and agree that the record accurately reflects my personal performance of the history, physical exam, assessment and plan.  Our office nurse practitioner was present for the duration of the office visit.

## 2024-12-19 NOTE — CONSULT LETTER
[Dear  ___] : Dear  [unfilled], [Consult Letter:] : I had the pleasure of evaluating your patient, [unfilled]. [Please see my note below.] : Please see my note below. [Consult Closing:] : Thank you very much for allowing me to participate in the care of this patient.  If you have any questions, please do not hesitate to contact me. [Sincerely,] : Sincerely, [FreeTextEntry2] : Ramiro Rivera MD  [FreeTextEntry3] : Evan Love MD\par  Surgical Oncology\par  [facility]\par  Office: [phone]\par  Cell: [phone]\par

## 2024-12-19 NOTE — PHYSICAL EXAM
[Normal] : normal breast inspection and palpation of axillas [Normal Supraclavicular Lymph Nodes] : normal supraclavicular lymph nodes [Normal Axillary Lymph Nodes] : normal axillary lymph nodes [Normal] : oriented to person, place and time, with appropriate affect [FreeTextEntry1] : SS present during physical exam.  [de-identified] : well healed left breast incision

## 2024-12-26 ENCOUNTER — APPOINTMENT (OUTPATIENT)
Dept: PHARMACY | Facility: CLINIC | Age: 69
End: 2024-12-26
Payer: SELF-PAY

## 2024-12-26 PROCEDURE — V5299A: CUSTOM

## 2025-02-03 ENCOUNTER — OUTPATIENT (OUTPATIENT)
Dept: OUTPATIENT SERVICES | Facility: HOSPITAL | Age: 70
LOS: 1 days | Discharge: HOME | End: 2025-02-03

## 2025-02-03 DIAGNOSIS — Z98.890 OTHER SPECIFIED POSTPROCEDURAL STATES: Chronic | ICD-10-CM

## 2025-02-03 DIAGNOSIS — C50.919 MALIGNANT NEOPLASM OF UNSPECIFIED SITE OF UNSPECIFIED FEMALE BREAST: ICD-10-CM

## 2025-02-14 ENCOUNTER — NON-APPOINTMENT (OUTPATIENT)
Age: 70
End: 2025-02-14

## 2025-02-14 ENCOUNTER — APPOINTMENT (OUTPATIENT)
Dept: INFUSION THERAPY | Facility: HOSPITAL | Age: 70
End: 2025-02-14

## 2025-02-14 ENCOUNTER — RESULT REVIEW (OUTPATIENT)
Age: 70
End: 2025-02-14

## 2025-02-14 ENCOUNTER — APPOINTMENT (OUTPATIENT)
Dept: HEMATOLOGY ONCOLOGY | Facility: CLINIC | Age: 70
End: 2025-02-14
Payer: MEDICARE

## 2025-02-14 VITALS
DIASTOLIC BLOOD PRESSURE: 65 MMHG | HEART RATE: 81 BPM | OXYGEN SATURATION: 96 % | TEMPERATURE: 98.9 F | HEIGHT: 60.83 IN | RESPIRATION RATE: 16 BRPM | BODY MASS INDEX: 23.73 KG/M2 | WEIGHT: 125.66 LBS | SYSTOLIC BLOOD PRESSURE: 106 MMHG

## 2025-02-14 DIAGNOSIS — D05.10 INTRADUCTAL CARCINOMA IN SITU OF UNSPECIFIED BREAST: ICD-10-CM

## 2025-02-14 LAB
BASOPHILS # BLD AUTO: 0.05 K/UL — SIGNIFICANT CHANGE UP (ref 0–0.2)
BASOPHILS NFR BLD AUTO: 0.8 % — SIGNIFICANT CHANGE UP (ref 0–2)
EOSINOPHIL # BLD AUTO: 0.18 K/UL — SIGNIFICANT CHANGE UP (ref 0–0.5)
EOSINOPHIL NFR BLD AUTO: 2.8 % — SIGNIFICANT CHANGE UP (ref 0–6)
HCT VFR BLD CALC: 39.1 % — SIGNIFICANT CHANGE UP (ref 34.5–45)
HGB BLD-MCNC: 12.6 G/DL — SIGNIFICANT CHANGE UP (ref 11.5–15.5)
IMM GRANULOCYTES NFR BLD AUTO: 0.3 % — SIGNIFICANT CHANGE UP (ref 0–0.9)
LYMPHOCYTES # BLD AUTO: 1.64 K/UL — SIGNIFICANT CHANGE UP (ref 1–3.3)
LYMPHOCYTES # BLD AUTO: 25.9 % — SIGNIFICANT CHANGE UP (ref 13–44)
MCHC RBC-ENTMCNC: 27.9 PG — SIGNIFICANT CHANGE UP (ref 27–34)
MCHC RBC-ENTMCNC: 32.2 G/DL — SIGNIFICANT CHANGE UP (ref 32–36)
MCV RBC AUTO: 86.5 FL — SIGNIFICANT CHANGE UP (ref 80–100)
MONOCYTES # BLD AUTO: 0.55 K/UL — SIGNIFICANT CHANGE UP (ref 0–0.9)
MONOCYTES NFR BLD AUTO: 8.7 % — SIGNIFICANT CHANGE UP (ref 2–14)
NEUTROPHILS # BLD AUTO: 3.89 K/UL — SIGNIFICANT CHANGE UP (ref 1.8–7.4)
NEUTROPHILS NFR BLD AUTO: 61.5 % — SIGNIFICANT CHANGE UP (ref 43–77)
NRBC BLD AUTO-RTO: 0 /100 WBCS — SIGNIFICANT CHANGE UP (ref 0–0)
PLATELET # BLD AUTO: 218 K/UL — SIGNIFICANT CHANGE UP (ref 150–400)
RBC # BLD: 4.52 M/UL — SIGNIFICANT CHANGE UP (ref 3.8–5.2)
RBC # FLD: 12.7 % — SIGNIFICANT CHANGE UP (ref 10.3–14.5)
WBC # BLD: 6.33 K/UL — SIGNIFICANT CHANGE UP (ref 3.8–10.5)
WBC # FLD AUTO: 6.33 K/UL — SIGNIFICANT CHANGE UP (ref 3.8–10.5)

## 2025-02-14 PROCEDURE — G2211 COMPLEX E/M VISIT ADD ON: CPT

## 2025-02-14 PROCEDURE — 99204 OFFICE O/P NEW MOD 45 MIN: CPT

## 2025-02-14 NOTE — HISTORY OF PRESENT ILLNESS
[100: Normal, no complaints, no evidence of disease.] : 100: Normal, no complaints, no evidence of disease. [ECOG Performance Status: 0 - Fully active, able to carry on all pre-disease performance without restriction] : Performance Status: 0 - Fully active, able to carry on all pre-disease performance without restriction [de-identified] : Ms. TAMEKA SANZ is a 67 year old female here for an evaluation of breast cancer. Her oncologic history is as follows:  On 5/1/23 she completed a screening mammogram and breast ultrasound. On mammogram, there is interval development of mild asymmetry seen in the upper outer left breast. Ultrasound revealed interval development of a 6 mm nodule in the left breast at 2:00, N+4. Ultrasound-guided biopsy is recommended (BIRADS 4).  Biopsy was performed on 5/3/23 and pathology demonstrated DCIS, intermediate nuclear grade, ER/KS+.  Breast MRI 5/11/23- newly diagnosed DCIS 2:00 left breast. There is a 6 mm enhancing nodule with clip in place in the upper outer quadrant. No evidence of multifocal/multicentric or contralateral disease.   7/14/23: Patient is s/p left breast lumpectomy 6/6/23 final pathology revealed DCIS, intermediate nuclear grade, cribriform type, with focal necrosis, also involving an intraductal papilloma. Margin benign. She is feeling well overall, surgical site healing well no evidence of infection, seroma or hematoma.   PMH: noncontributory; PSH: myomectomy Family hx: Mother: Lymphoma; Father: pancreatic and prostate cancer; paternal grandmother: leukemia Social: No smoking, no drinking; works as a ; post-menopausal and has not had any HRT  [de-identified] : 67 year old female here for an evaluation of breast cancer with newly diagnosed DCIS, intermediate nuclear grade, ER/CO+. S/p left breast lumpectomy 6/6/23 final pathology revealed DCIS, intermediate nuclear grade, cribriform type, with focal necrosis, also involving an intraductal papilloma. Margin benign. She is feeling well overall, surgical site healing well no evidence of infection, seroma or hematoma. Of note, patient's father with h/o pancreatic and prostate cancer.  RT completed 9/2023. AI started 10/2023 Takes AI daily, good compliance. She denies aches/pain, hot flashes, vag dryness, excessive fatigue, GI s/e, hair loss. She is active, no change in energy, wt or appetite.  mammogram -  11/2023 birads 3  DEXA- osteoporosis, started prolia 8/2023. s/p second dose 2/2024. She takes ca+vit D. No dental issues.  Father: pancreatic and prostate cancer: will discuss genetics again next visit  8/2/2024 Patient presents today for follow-up. She is currently taking Anastrozole with good efficacy. She denies any s/e to include; arthralgias, hair thinning, vaginal dryness, GI se/, SOB, or palpitations.  Breast Health: Bilateral Screening Mammography & Bilateral Complete US completed at [facility] on 5/14/2014; BI-RADS 3 Imaging ordered and managed by Dr. Love. Recommended Imaging ordered for 11/2. Breast MRI scheduled for 11/20/24. Patient to be seen in December 2024.  Bone Health: Last DEXA Bone Imaging completed on 28 July 2023; imaging demonstrated Osteoporosis at the Femoral Neck (-3.0). Prolia initiated 8/23. Received her last injection today. Recommended to repeat imaging every 1-2 years. Denies any dental issues and continues to take both Calcium and Vitamin D.  Genetics: patient report she will speak to her  and contact the office if she wishes to be tested. RTO: 6 months  2/14/25: Patient presents for a follow-up. Reports good compliance with very manageable side effects. Had interval breast imaging with breast MRI and mammo/sono. Denies any recent illnesses, interval hospitalizations, or urgent care visits.

## 2025-02-14 NOTE — PHYSICAL EXAM
[Fully active, able to carry on all pre-disease performance without restriction] : Status 0 - Fully active, able to carry on all pre-disease performance without restriction [Normal] : affect appropriate [de-identified] : Well healed lumpectomy scar

## 2025-02-14 NOTE — ASSESSMENT
[FreeTextEntry1] : 69 year-old female here for an evaluation of breast cancer with newly diagnosed DCIS, intermediate nuclear grade, ER/MA+. S/p left breast lumpectomy 6/6/23 RT completed 9/2023. AI started 10/2023.   1. Breast ca- Ms. TAMEKA ISLAS is tolerating AI well, good compliance. She has mild side effects from the treatment. Continue treatment x 5 yrs. Annual breast imaging reviewed - repeat around May to ensure stable radiation changes. Will refill anastrozole.  2. Osteoporosis: Concern for worsening bone density and fractures due to anastrozole. Recommended to continue calcium and Vitamin D. DEXA 1-2 yrs. PROLIA due today.  3. High cholesterol/CAD risk factors: Concern for worsening cholesterol/CAD risk factors due to anastrozole. Pt is on Crestor. Lipid profile annually. Life-style modifications d/w her. 4. Genetic Testing: discussed testing with patient given personal history of Breast Cancer and her father's history of both Pancreatic and Prostate Cancer. Ms. Islas wishes to speak with her  and contact the office if she wishes to complete testing.  	 RTC 6 m

## 2025-02-14 NOTE — HISTORY OF PRESENT ILLNESS
[100: Normal, no complaints, no evidence of disease.] : 100: Normal, no complaints, no evidence of disease. [ECOG Performance Status: 0 - Fully active, able to carry on all pre-disease performance without restriction] : Performance Status: 0 - Fully active, able to carry on all pre-disease performance without restriction [de-identified] : Ms. TAMEKA SANZ is a 67 year old female here for an evaluation of breast cancer. Her oncologic history is as follows:  On 5/1/23 she completed a screening mammogram and breast ultrasound. On mammogram, there is interval development of mild asymmetry seen in the upper outer left breast. Ultrasound revealed interval development of a 6 mm nodule in the left breast at 2:00, N+4. Ultrasound-guided biopsy is recommended (BIRADS 4).  Biopsy was performed on 5/3/23 and pathology demonstrated DCIS, intermediate nuclear grade, ER/VT+.  Breast MRI 5/11/23- newly diagnosed DCIS 2:00 left breast. There is a 6 mm enhancing nodule with clip in place in the upper outer quadrant. No evidence of multifocal/multicentric or contralateral disease.   7/14/23: Patient is s/p left breast lumpectomy 6/6/23 final pathology revealed DCIS, intermediate nuclear grade, cribriform type, with focal necrosis, also involving an intraductal papilloma. Margin benign. She is feeling well overall, surgical site healing well no evidence of infection, seroma or hematoma.   PMH: noncontributory; PSH: myomectomy Family hx: Mother: Lymphoma; Father: pancreatic and prostate cancer; paternal grandmother: leukemia Social: No smoking, no drinking; works as a ; post-menopausal and has not had any HRT  [de-identified] : 67 year old female here for an evaluation of breast cancer with newly diagnosed DCIS, intermediate nuclear grade, ER/ME+. S/p left breast lumpectomy 6/6/23 final pathology revealed DCIS, intermediate nuclear grade, cribriform type, with focal necrosis, also involving an intraductal papilloma. Margin benign. She is feeling well overall, surgical site healing well no evidence of infection, seroma or hematoma. Of note, patient's father with h/o pancreatic and prostate cancer.  RT completed 9/2023. AI started 10/2023 Takes AI daily, good compliance. She denies aches/pain, hot flashes, vag dryness, excessive fatigue, GI s/e, hair loss. She is active, no change in energy, wt or appetite.  mammogram -  11/2023 birads 3  DEXA- osteoporosis, started prolia 8/2023. s/p second dose 2/2024. She takes ca+vit D. No dental issues.  Father: pancreatic and prostate cancer: will discuss genetics again next visit  8/2/2024 Patient presents today for follow-up. She is currently taking Anastrozole with good efficacy. She denies any s/e to include; arthralgias, hair thinning, vaginal dryness, GI se/, SOB, or palpitations.  Breast Health: Bilateral Screening Mammography & Bilateral Complete US completed at [facility] on 5/14/2014; BI-RADS 3 Imaging ordered and managed by Dr. Love. Recommended Imaging ordered for 11/2. Breast MRI scheduled for 11/20/24. Patient to be seen in December 2024.  Bone Health: Last DEXA Bone Imaging completed on 28 July 2023; imaging demonstrated Osteoporosis at the Femoral Neck (-3.0). Prolia initiated 8/23. Received her last injection today. Recommended to repeat imaging every 1-2 years. Denies any dental issues and continues to take both Calcium and Vitamin D.  Genetics: patient report she will speak to her  and contact the office if she wishes to be tested. RTO: 6 months  2/14/25: Patient presents for a follow-up. Reports good compliance with very manageable side effects. Had interval breast imaging with breast MRI and mammo/sono. Denies any recent illnesses, interval hospitalizations, or urgent care visits.

## 2025-02-14 NOTE — CONSULT LETTER
[Dear  ___] : Dear  [unfilled], [Consult Letter:] : I had the pleasure of evaluating your patient, [unfilled]. [Please see my note below.] : Please see my note below. [Consult Closing:] : Thank you very much for allowing me to participate in the care of this patient.  If you have any questions, please do not hesitate to contact me. [Sincerely,] : Sincerely, [FreeTextEntry3] : Micki Merlos MD\par  Attending Physician, Division of Medical Oncology and Hematology\par  Medical Director, Center for Cancer, Pregnancy and Reproduction\par  Medical Director, Breast Wellness and Cancer Prevention Program \par  [facility] [place]\par  [facility] [place]\par  , [facility] [employer] [facility]

## 2025-02-14 NOTE — CONSULT LETTER
[Dear  ___] : Dear  [unfilled], [Consult Letter:] : I had the pleasure of evaluating your patient, [unfilled]. [Please see my note below.] : Please see my note below. [Consult Closing:] : Thank you very much for allowing me to participate in the care of this patient.  If you have any questions, please do not hesitate to contact me. [Sincerely,] : Sincerely, [FreeTextEntry3] : Micki Merlos MD\par  Attending Physician, Division of Medical Oncology and Hematology\par  Medical Director, Center for Cancer, Pregnancy and Reproduction\par  Medical Director, Breast Wellness and Cancer Prevention Program \par  [facility] [place]\par  [facility] [place]\par  , [facility] [employer] [facility]

## 2025-02-14 NOTE — PHYSICAL EXAM
[Fully active, able to carry on all pre-disease performance without restriction] : Status 0 - Fully active, able to carry on all pre-disease performance without restriction [Normal] : affect appropriate [de-identified] : Well healed lumpectomy scar

## 2025-02-14 NOTE — ASSESSMENT
[FreeTextEntry1] : 69 year-old female here for an evaluation of breast cancer with newly diagnosed DCIS, intermediate nuclear grade, ER/NE+. S/p left breast lumpectomy 6/6/23 RT completed 9/2023. AI started 10/2023.   1. Breast ca- Ms. TAMEKA ISLAS is tolerating AI well, good compliance. She has mild side effects from the treatment. Continue treatment x 5 yrs. Annual breast imaging reviewed - repeat around May to ensure stable radiation changes. Will refill anastrozole.  2. Osteoporosis: Concern for worsening bone density and fractures due to anastrozole. Recommended to continue calcium and Vitamin D. DEXA 1-2 yrs. PROLIA due today.  3. High cholesterol/CAD risk factors: Concern for worsening cholesterol/CAD risk factors due to anastrozole. Pt is on Crestor. Lipid profile annually. Life-style modifications d/w her. 4. Genetic Testing: discussed testing with patient given personal history of Breast Cancer and her father's history of both Pancreatic and Prostate Cancer. Ms. Islas wishes to speak with her  and contact the office if she wishes to complete testing.  	 RTC 6 m

## 2025-02-15 LAB
ALBUMIN SERPL ELPH-MCNC: 4.4 G/DL
ALP BLD-CCNC: 88 U/L
ALT SERPL-CCNC: 18 U/L
ANION GAP SERPL CALC-SCNC: 11 MMOL/L
AST SERPL-CCNC: 18 U/L
BILIRUB SERPL-MCNC: 0.2 MG/DL
BUN SERPL-MCNC: 23 MG/DL
CALCIUM SERPL-MCNC: 10 MG/DL
CHLORIDE SERPL-SCNC: 102 MMOL/L
CO2 SERPL-SCNC: 26 MMOL/L
CREAT SERPL-MCNC: 1.01 MG/DL
EGFR: 61 ML/MIN/1.73M2
GLUCOSE SERPL-MCNC: 89 MG/DL
POTASSIUM SERPL-SCNC: 3.9 MMOL/L
PROT SERPL-MCNC: 7.2 G/DL
SODIUM SERPL-SCNC: 139 MMOL/L

## 2025-02-18 DIAGNOSIS — M81.0 AGE-RELATED OSTEOPOROSIS WITHOUT CURRENT PATHOLOGICAL FRACTURE: ICD-10-CM

## 2025-02-19 ENCOUNTER — APPOINTMENT (OUTPATIENT)
Dept: OTOLARYNGOLOGY | Facility: CLINIC | Age: 70
End: 2025-02-19

## 2025-02-19 ENCOUNTER — APPOINTMENT (OUTPATIENT)
Dept: ORTHOPEDIC SURGERY | Facility: CLINIC | Age: 70
End: 2025-02-19
Payer: MEDICARE

## 2025-02-19 VITALS — WEIGHT: 125 LBS | SYSTOLIC BLOOD PRESSURE: 107 MMHG | DIASTOLIC BLOOD PRESSURE: 64 MMHG | BODY MASS INDEX: 23.75 KG/M2

## 2025-02-19 VITALS — HEIGHT: 60.83 IN | BODY MASS INDEX: 23.6 KG/M2 | WEIGHT: 125 LBS

## 2025-02-19 DIAGNOSIS — H90.A32 MIXED CONDUCTIVE AND SENSORINEURAL HEARING, UNILATERAL, LEFT EAR WITH RESTRICTED HEARING ON THE  CONTRALATERAL SIDE: ICD-10-CM

## 2025-02-19 DIAGNOSIS — G57.01 LESION OF SCIATIC NERVE, RIGHT LOWER LIMB: ICD-10-CM

## 2025-02-19 DIAGNOSIS — M25.561 PAIN IN RIGHT KNEE: ICD-10-CM

## 2025-02-19 PROCEDURE — 92557 COMPREHENSIVE HEARING TEST: CPT

## 2025-02-19 PROCEDURE — 99204 OFFICE O/P NEW MOD 45 MIN: CPT

## 2025-02-19 PROCEDURE — 99203 OFFICE O/P NEW LOW 30 MIN: CPT

## 2025-02-19 PROCEDURE — 73590 X-RAY EXAM OF LOWER LEG: CPT | Mod: RT

## 2025-02-19 PROCEDURE — 92567 TYMPANOMETRY: CPT

## 2025-02-19 RX ORDER — METFORMIN HYDROCHLORIDE 750 MG/1
TABLET ORAL
Refills: 0 | Status: ACTIVE | COMMUNITY

## 2025-02-19 RX ORDER — DICLOFENAC SODIUM 10 MG/G
1 GEL TOPICAL DAILY
Qty: 2 | Refills: 0 | Status: ACTIVE | COMMUNITY
Start: 2025-02-19 | End: 1900-01-01

## 2025-02-28 NOTE — DATA REVIEWED
[de-identified] : Right ear: Severe rising to moderately-severe mixed HL  Left ear: Profound rising to essentially moderately-severe/severe mixed HL  Type A tymps Au

## 2025-02-28 NOTE — HISTORY OF PRESENT ILLNESS
[de-identified] : 70 y/o F presents for annual follow up hearing loss. Obtained new b/l hearing aids last year, wearing daily. Hearing overall reported stable. Denies otalgia, otorrhea, ear infections, dizziness, vertigo, tinnitus.

## 2025-02-28 NOTE — DATA REVIEWED
[de-identified] : Right ear: Severe rising to moderately-severe mixed HL  Left ear: Profound rising to essentially moderately-severe/severe mixed HL  Type A tymps Au

## 2025-02-28 NOTE — HISTORY OF PRESENT ILLNESS
[de-identified] : 70 y/o F presents for annual follow up hearing loss. Obtained new b/l hearing aids last year, wearing daily. Hearing overall reported stable. Denies otalgia, otorrhea, ear infections, dizziness, vertigo, tinnitus.

## 2025-04-15 ENCOUNTER — APPOINTMENT (OUTPATIENT)
Dept: PHARMACY | Facility: CLINIC | Age: 70
End: 2025-04-15
Payer: SELF-PAY

## 2025-04-15 PROCEDURE — V5299A: CUSTOM

## 2025-05-08 ENCOUNTER — NON-APPOINTMENT (OUTPATIENT)
Age: 70
End: 2025-05-08

## 2025-05-27 DIAGNOSIS — D05.10 INTRADUCTAL CARCINOMA IN SITU OF UNSPECIFIED BREAST: ICD-10-CM

## 2025-06-26 NOTE — END OF VISIT
OPERATIVE REPORT  PATIENT NAME: Rebecca Suazo    :  1935  MRN: [id number]  Pt Location: [facility] OR ROOM 01    SURGERY DATE: 2018    Surgeon(s) and Role:     * Leela Dyson MD - Primary    Preop Diagnosis:  Mass of right axilla [R22 31]    Post-Op Diagnosis Codes:     * Mass of right axilla [R22 31]    Procedure(s) (LRB):  INCISION AND DRAINAGE (I&D) TRUNK (Right)    Specimen(s):  ID Type Source Tests Collected by Time Destination   A : right axilla abscess fluid Wound Wound ANAEROBIC CULTURE AND GRAM STAIN, WOUND CULTURE Leela Dyson MD 2018 [id number]        Estimated Blood Loss:   Minimal    Drains:  Urethral Catheter Double-lumen 16 Fr  (Active)   Amt returned on insertion(mL) 300 mL 2018  3:01 PM   Site Assessment Clean 2018 12:54 PM   Collection Container Standard drainage bag 2018  2:10 PM   Securement Method Securing device (Describe) 2018  2:10 PM   Output (mL) 50 mL 2018  2:39 PM   Number of days: 1       Anesthesia Type:   General    Operative Indications: Mass of right axilla [R22 31]  Abscess Right axilla    Operative Findings:  Large amount of purulent drainage with cyst rind    Complications:   None    Procedure and Technique:  The patient was placed in supine position  The right axilla was prepped and draped in standard fashion  Local anesthesia was used to anesthetize the skin surrounding a 13 cm  erythematous area  A vertical incision was made over the most fluctuant area  A large amount of white purulent fluid was drained from deep to level of muscle/tendon location and a rind of white fibrous tissue was resected with the scissors  Hemostasis was achieved with direct pressure  The wound was irrigated with saline  The wound was packed with sterile gauze and covered with a sterile dressing      At the end of the operation, all sponge, instrument, and needle counts were correct     A physician assistant, Evangelina Kern, was required during the procedure for retraction tissue handling,dissection and suturing    Patient Disposition:  PACU     SIGNATURE: Roverto Tafoya MD  DATE: July 12, 2018  TIME: 3:52 PM [Time Spent: ___ minutes] : I have spent [unfilled] minutes of time on the encounter which excludes teaching and separately reported services.

## 2025-07-03 ENCOUNTER — NON-APPOINTMENT (OUTPATIENT)
Age: 70
End: 2025-07-03

## 2025-07-03 ENCOUNTER — APPOINTMENT (OUTPATIENT)
Dept: PHARMACY | Facility: CLINIC | Age: 70
End: 2025-07-03
Payer: SELF-PAY

## 2025-07-03 ENCOUNTER — APPOINTMENT (OUTPATIENT)
Dept: SURGICAL ONCOLOGY | Facility: CLINIC | Age: 70
End: 2025-07-03
Payer: MEDICARE

## 2025-07-03 VITALS
HEART RATE: 57 BPM | OXYGEN SATURATION: 99 % | WEIGHT: 111 LBS | HEIGHT: 61 IN | BODY MASS INDEX: 20.96 KG/M2 | RESPIRATION RATE: 17 BRPM | DIASTOLIC BLOOD PRESSURE: 70 MMHG | SYSTOLIC BLOOD PRESSURE: 123 MMHG

## 2025-07-03 PROCEDURE — V5264F: CUSTOM | Mod: LT

## 2025-07-03 PROCEDURE — 99214 OFFICE O/P EST MOD 30 MIN: CPT

## 2025-07-03 NOTE — ASSESSMENT
[FreeTextEntry1] : Mireya will follow-up in 6 months after her breast MRI.   All medical entries were at my, Dr. Evan Love, direction.  I have reviewed the chart and agree that the record accurately reflects my personal performance of the history, physical exam, assessment and plan.  Our office nurse practitioner was present for the duration of the office visit.

## 2025-07-03 NOTE — RESULTS/DATA
Check PTH and Vitamin D  Check Mg and phos.     Orders:    Magnesium; Future    PTH, intact; Future    Vitamin D 25 hydroxy; Future     [FreeTextEntry1] : I have reviewed and interpreted the pertinent imaging, blood work and pathology.

## 2025-07-03 NOTE — PHYSICAL EXAM
[Normal] : normal breast inspection and palpation of axillas [Normal Supraclavicular Lymph Nodes] : normal supraclavicular lymph nodes [Normal Axillary Lymph Nodes] : normal axillary lymph nodes [Normal] : oriented to person, place and time, with appropriate affect [FreeTextEntry1] : SS present during physical exam.  [de-identified] : well healed left breast incision

## 2025-07-03 NOTE — CONSULT LETTER
[Dear  ___] : Dear  [unfilled], [Consult Letter:] : I had the pleasure of evaluating your patient, [unfilled]. [Please see my note below.] : Please see my note below. [Consult Closing:] : Thank you very much for allowing me to participate in the care of this patient.  If you have any questions, please do not hesitate to contact me. [Sincerely,] : Sincerely, [DrHans  ___] : Dr. THIBODEAUX [FreeTextEntry2] : Ramiro Rivera MD  [FreeTextEntry3] : Evan Love MD\par  Surgical Oncology\par  [facility]\par  Office: [phone]\par  Cell: [phone]\par

## 2025-07-03 NOTE — HISTORY OF PRESENT ILLNESS
[de-identified] : Ms. TAMEKA SANZ is a 69-year-old woman here for ongoing breast follow-up.   She has been vigilant about her yearly breast screenings and denies any prior breast history.  On 23 she completed a screening mammogram and breast ultrasound.  On mammogram, there is interval development of mild asymmetry seen in the upper outer left breast.  Ultrasound revealed interval development of a 6 mm nodule in the left breast at 2:00, N+4.  Ultrasound-guided biopsy is recommended (BIRADS 4).  Biopsy was performed on 5/3/23 and pathology demonstrated DCIS, intermediate nuclear grade, ER/KS+.  Breast MRI 23- newly diagnosed DCIS 2:00 left breast.  There is a 6 mm enhancing nodule with clip in place in the upper outer quadrant.  No evidence of multifocal/multicentric or contralateral disease.    **SURGERY** s/p left breast lumpectomy 2023 final pathology revealed DCIS, at least 8 mm, IDP, margins negative, pTis, ER+/KS+  ONCOTYPE DX= 8  PMH: GERD (on PPI) PSH: uterine myomectomy Family Hx: father + pancreatic (diagnosed 80's) & prostate cancer, mother + lymphoma Social Hx: non-smoker, denies ETOH, works in [employer]  Established with Dr. Micki Merlos and consented to begin Anastrozole. She is pending a consultation with Dr. Grijalva from radiation oncology on 23.  23- Tameka is feeling well.  Denies any pain, swelling, erythema, drainage, fever or chills.   She received adjuvant RT to the left breast 23 - 23 with Dr. Grijalva.  She started Anastrozole in 2023.  Left mammogram/sonogram 2023 ([facility]) demonstrates postop changes but no evidence of malignancy, recommend 6 month follow up at the time of her annual bilateral mammo/sono due 2023 (BIRADS 3).  23- Tameka returns for routine follow up.   She denies any breast masses, nipple discharge, inversion or skin change.  She tripped over her loose shoe in the building earlier, fell and landed on her hand/wrist, has some bruising on her right forearm, did not hit her head.  States she feels sore but ok. Tameka will take NSAIDs for analgesia after her fall.  She has declined any imaging at this point.  She will contact us if she wishes additional imaging.  She will follow-up with us in 6 months upon completion of her annual mammogram and sonogram.  B/L mammo/sono (@ [facility]) 2024 - post lumpectomy scarring to L breast -> in absence of clinical suspicion, f/u L mammo/sono in 6 months BI-RADS 3   2024 - Tameka returns for ongoing follow-up, she is feeling well overall, tolerating Anastrozole under Dr. Merlos. She denies any new health issues, denies palpable breast masses, nipple discharge, skin changes, inversion or breast pain.   Left mammo/sono 2024 ([facility]) - stable post-surgical changes -> f/u in 6 months w/ annual imaging BI-RADS 3  Breast MRI 2024 - BIRADS 2  2024 - Tameka is here for ongoing breast surveillance, she denies palpable breast masses, nipple discharge, skin changes, inversion or breast pain. She remains on Anastrozole under the care of Dr. Merlos. She denies any new or worrisome health issues. Tameka is doing well; she will follow-up in 6 months after her annual mammo/sono. She will continue close follow-up with Dr. Merlos as well.   B/L mammo/sono 2025 ([facility]) - post lumpectomy changes in L breast  - nodular density in R 6:00 - f/u Right mammo/sono BI-RADS 0  RIGHT mammo/sono 2025 - BIRADS 2   7/3/2025 - Tameka is here for ongoing follow-up, she is feeling well overall. She denies any new or worrisome health issues and remains on Anastrozole under Dr. Merlos.

## 2025-07-03 NOTE — PHYSICAL EXAM
[Normal] : normal breast inspection and palpation of axillas [Normal Supraclavicular Lymph Nodes] : normal supraclavicular lymph nodes [Normal Axillary Lymph Nodes] : normal axillary lymph nodes [Normal] : oriented to person, place and time, with appropriate affect [FreeTextEntry1] : SS present during physical exam.  [de-identified] : well healed left breast incision

## 2025-07-03 NOTE — CONSULT LETTER
[Dear  ___] : Dear  [unfilled], [Consult Letter:] : I had the pleasure of evaluating your patient, [unfilled]. [Please see my note below.] : Please see my note below. [Consult Closing:] : Thank you very much for allowing me to participate in the care of this patient.  If you have any questions, please do not hesitate to contact me. [Sincerely,] : Sincerely, [DrHans  ___] : Dr. THIBODEAUX [FreeTextEntry2] : Ramiro Rivera MD  [FreeTextEntry3] : Evan Love MD\par  Surgical Oncology\par  [facility]\par  Office: [phone]\par  Cell: [phone]\par

## 2025-07-03 NOTE — HISTORY OF PRESENT ILLNESS
[de-identified] : Ms. TAMEKA SANZ is a 69-year-old woman here for ongoing breast follow-up.   She has been vigilant about her yearly breast screenings and denies any prior breast history.  On 23 she completed a screening mammogram and breast ultrasound.  On mammogram, there is interval development of mild asymmetry seen in the upper outer left breast.  Ultrasound revealed interval development of a 6 mm nodule in the left breast at 2:00, N+4.  Ultrasound-guided biopsy is recommended (BIRADS 4).  Biopsy was performed on 5/3/23 and pathology demonstrated DCIS, intermediate nuclear grade, ER/PA+.  Breast MRI 23- newly diagnosed DCIS 2:00 left breast.  There is a 6 mm enhancing nodule with clip in place in the upper outer quadrant.  No evidence of multifocal/multicentric or contralateral disease.    **SURGERY** s/p left breast lumpectomy 2023 final pathology revealed DCIS, at least 8 mm, IDP, margins negative, pTis, ER+/PA+  ONCOTYPE DX= 8  PMH: GERD (on PPI) PSH: uterine myomectomy Family Hx: father + pancreatic (diagnosed 80's) & prostate cancer, mother + lymphoma Social Hx: non-smoker, denies ETOH, works in [employer]  Established with Dr. Micki Merlos and consented to begin Anastrozole. She is pending a consultation with Dr. Grijalva from radiation oncology on 23.  23- Tameka is feeling well.  Denies any pain, swelling, erythema, drainage, fever or chills.   She received adjuvant RT to the left breast 23 - 23 with Dr. Grijalva.  She started Anastrozole in 2023.  Left mammogram/sonogram 2023 ([facility]) demonstrates postop changes but no evidence of malignancy, recommend 6 month follow up at the time of her annual bilateral mammo/sono due 2023 (BIRADS 3).  23- Tameka returns for routine follow up.   She denies any breast masses, nipple discharge, inversion or skin change.  She tripped over her loose shoe in the building earlier, fell and landed on her hand/wrist, has some bruising on her right forearm, did not hit her head.  States she feels sore but ok. Tameka will take NSAIDs for analgesia after her fall.  She has declined any imaging at this point.  She will contact us if she wishes additional imaging.  She will follow-up with us in 6 months upon completion of her annual mammogram and sonogram.  B/L mammo/sono (@ [facility]) 2024 - post lumpectomy scarring to L breast -> in absence of clinical suspicion, f/u L mammo/sono in 6 months BI-RADS 3   2024 - Tameka returns for ongoing follow-up, she is feeling well overall, tolerating Anastrozole under Dr. Merlos. She denies any new health issues, denies palpable breast masses, nipple discharge, skin changes, inversion or breast pain.   Left mammo/sono 2024 ([facility]) - stable post-surgical changes -> f/u in 6 months w/ annual imaging BI-RADS 3  Breast MRI 2024 - BIRADS 2  2024 - Tameka is here for ongoing breast surveillance, she denies palpable breast masses, nipple discharge, skin changes, inversion or breast pain. She remains on Anastrozole under the care of Dr. Merlos. She denies any new or worrisome health issues. Tameka is doing well; she will follow-up in 6 months after her annual mammo/sono. She will continue close follow-up with Dr. Merlos as well.   B/L mammo/sono 2025 ([facility]) - post lumpectomy changes in L breast  - nodular density in R 6:00 - f/u Right mammo/sono BI-RADS 0  RIGHT mammo/sono 2025 - BIRADS 2   7/3/2025 - Tameka is here for ongoing follow-up, she is feeling well overall. She denies any new or worrisome health issues and remains on Anastrozole under Dr. Merlos.

## 2025-07-23 ENCOUNTER — APPOINTMENT (OUTPATIENT)
Dept: PHARMACY | Facility: CLINIC | Age: 70
End: 2025-07-23
Payer: SELF-PAY

## 2025-07-23 PROCEDURE — V5299A: CUSTOM

## 2025-07-24 ENCOUNTER — APPOINTMENT (OUTPATIENT)
Dept: PHARMACY | Facility: CLINIC | Age: 70
End: 2025-07-24
Payer: SELF-PAY

## 2025-07-24 PROCEDURE — V5299A: CUSTOM

## 2025-07-28 ENCOUNTER — APPOINTMENT (OUTPATIENT)
Dept: OBGYN | Facility: CLINIC | Age: 70
End: 2025-07-28
Payer: MEDICARE

## 2025-07-28 PROCEDURE — G0444 DEPRESSION SCREEN ANNUAL: CPT

## 2025-07-28 PROCEDURE — G0101: CPT | Mod: GA

## 2025-07-29 NOTE — RESULTS/DATA
Pt scheduled Friday afternoon   [FreeTextEntry1] : Laboratory data, radiology and pathology reviewed in detail at the time of visit.\par

## 2025-08-01 ENCOUNTER — OUTPATIENT (OUTPATIENT)
Dept: OUTPATIENT SERVICES | Facility: HOSPITAL | Age: 70
LOS: 1 days | End: 2025-08-01
Payer: MEDICARE

## 2025-08-01 ENCOUNTER — APPOINTMENT (OUTPATIENT)
Dept: RADIOLOGY | Facility: CLINIC | Age: 70
End: 2025-08-01
Payer: MEDICARE

## 2025-08-01 DIAGNOSIS — Z98.890 OTHER SPECIFIED POSTPROCEDURAL STATES: Chronic | ICD-10-CM

## 2025-08-01 DIAGNOSIS — D05.10 INTRADUCTAL CARCINOMA IN SITU OF UNSPECIFIED BREAST: ICD-10-CM

## 2025-08-01 PROCEDURE — 77085 DXA BONE DENSITY AXL VRT FX: CPT | Mod: 26

## 2025-08-01 PROCEDURE — 77085 DXA BONE DENSITY AXL VRT FX: CPT

## 2025-08-05 ENCOUNTER — NON-APPOINTMENT (OUTPATIENT)
Age: 70
End: 2025-08-05

## 2025-08-05 ENCOUNTER — APPOINTMENT (OUTPATIENT)
Dept: PHARMACY | Facility: CLINIC | Age: 70
End: 2025-08-05

## 2025-08-06 ENCOUNTER — APPOINTMENT (OUTPATIENT)
Dept: PHARMACY | Facility: CLINIC | Age: 70
End: 2025-08-06
Payer: SELF-PAY

## 2025-08-06 PROCEDURE — V5299A: CUSTOM | Mod: LT

## 2025-08-19 ENCOUNTER — OFFICE (OUTPATIENT)
Dept: URBAN - METROPOLITAN AREA CLINIC 109 | Facility: CLINIC | Age: 70
Setting detail: OPHTHALMOLOGY
End: 2025-08-19
Payer: MEDICARE

## 2025-08-19 DIAGNOSIS — H25.13: ICD-10-CM

## 2025-08-19 DIAGNOSIS — H02.822: ICD-10-CM

## 2025-08-19 DIAGNOSIS — H16.223: ICD-10-CM

## 2025-08-19 PROCEDURE — 92012 INTRM OPH EXAM EST PATIENT: CPT | Performed by: OPHTHALMOLOGY

## 2025-08-19 ASSESSMENT — LID EXAM ASSESSMENTS
OD_BLEPHARITIS: ABSENT
OS_BLEPHARITIS: ABSENT

## 2025-08-19 ASSESSMENT — SUPERFICIAL PUNCTATE KERATITIS (SPK)
OS_SPK: T
OD_SPK: T

## 2025-08-19 ASSESSMENT — REFRACTION_CURRENTRX
OS_CYLINDER: -0.75
OD_OVR_VA: 20/
OD_SPHERE: -3.00
OS_OVR_VA: 20/
OD_AXIS: 50
OS_AXIS: 155
OS_SPHERE: -4.75
OD_CYLINDER: -0.75

## 2025-08-19 ASSESSMENT — CONFRONTATIONAL VISUAL FIELD TEST (CVF)
OD_FINDINGS: FULL
OS_FINDINGS: FULL

## 2025-08-19 ASSESSMENT — REFRACTION_AUTOREFRACTION
OD_CYLINDER: -0.75
OD_SPHERE: -2.50
OD_AXIS: 57
OS_CYLINDER: -0.75
OS_SPHERE: -4.25
OS_AXIS: 100

## 2025-08-19 ASSESSMENT — LID POSITION - PTOSIS
OD_PTOSIS: RUL 2+
OS_PTOSIS: LUL 2+

## 2025-08-19 ASSESSMENT — KERATOMETRY
OS_K1POWER_DIOPTERS: 44.50
OD_AXISANGLE_DEGREES: 023
OD_K1POWER_DIOPTERS: 44.25
OD_K2POWER_DIOPTERS: 44.75
OS_K2POWER_DIOPTERS: 45.00
OS_AXISANGLE_DEGREES: 171

## 2025-08-19 ASSESSMENT — TONOMETRY
OS_IOP_MMHG: 17
OD_IOP_MMHG: 17

## 2025-08-19 ASSESSMENT — DRY EYES - PHYSICIAN NOTES
OS_GENERALCOMMENTS: NO ABNORMAL STAINING
OD_GENERALCOMMENTS: NO ABNORMAL STAINING

## 2025-08-19 ASSESSMENT — VISUAL ACUITY
OS_BCVA: 20/20-3
OD_BCVA: 20/20-2

## 2025-08-25 ENCOUNTER — RESULT REVIEW (OUTPATIENT)
Age: 70
End: 2025-08-25

## 2025-08-25 ENCOUNTER — APPOINTMENT (OUTPATIENT)
Dept: INFUSION THERAPY | Facility: HOSPITAL | Age: 70
End: 2025-08-25

## 2025-08-25 ENCOUNTER — APPOINTMENT (OUTPATIENT)
Dept: HEMATOLOGY ONCOLOGY | Facility: CLINIC | Age: 70
End: 2025-08-25
Payer: MEDICARE

## 2025-08-25 VITALS
TEMPERATURE: 96.6 F | DIASTOLIC BLOOD PRESSURE: 64 MMHG | SYSTOLIC BLOOD PRESSURE: 99 MMHG | BODY MASS INDEX: 20.83 KG/M2 | RESPIRATION RATE: 17 BRPM | WEIGHT: 110.23 LBS | HEART RATE: 61 BPM | OXYGEN SATURATION: 98 %

## 2025-08-25 DIAGNOSIS — D05.10 INTRADUCTAL CARCINOMA IN SITU OF UNSPECIFIED BREAST: ICD-10-CM

## 2025-08-25 PROCEDURE — G2211 COMPLEX E/M VISIT ADD ON: CPT

## 2025-08-25 PROCEDURE — 99214 OFFICE O/P EST MOD 30 MIN: CPT

## 2025-08-26 ENCOUNTER — NON-APPOINTMENT (OUTPATIENT)
Age: 70
End: 2025-08-26

## 2025-08-26 LAB
ALBUMIN SERPL ELPH-MCNC: 4.8 G/DL
ALP BLD-CCNC: 43 U/L
ALT SERPL-CCNC: 28 U/L
ANION GAP SERPL CALC-SCNC: 14 MMOL/L
AST SERPL-CCNC: 25 U/L
BILIRUB SERPL-MCNC: 0.4 MG/DL
BUN SERPL-MCNC: 28 MG/DL
CALCIUM SERPL-MCNC: 10.4 MG/DL
CHLORIDE SERPL-SCNC: 102 MMOL/L
CO2 SERPL-SCNC: 25 MMOL/L
CREAT SERPL-MCNC: 1.03 MG/DL
EGFRCR SERPLBLD CKD-EPI 2021: 60 ML/MIN/1.73M2
GLUCOSE SERPL-MCNC: 89 MG/DL
POTASSIUM SERPL-SCNC: 4.4 MMOL/L
PROT SERPL-MCNC: 6.9 G/DL
SODIUM SERPL-SCNC: 141 MMOL/L

## 2025-09-08 ENCOUNTER — LABORATORY RESULT (OUTPATIENT)
Age: 70
End: 2025-09-08

## 2025-09-09 ENCOUNTER — NON-APPOINTMENT (OUTPATIENT)
Age: 70
End: 2025-09-09

## 2025-09-18 ENCOUNTER — APPOINTMENT (OUTPATIENT)
Dept: HEMATOLOGY ONCOLOGY | Facility: CLINIC | Age: 70
End: 2025-09-18
Payer: MEDICARE

## 2025-09-18 DIAGNOSIS — D64.9 ANEMIA, UNSPECIFIED: ICD-10-CM

## 2025-09-18 PROCEDURE — 99204 OFFICE O/P NEW MOD 45 MIN: CPT | Mod: 2W

## 2025-09-22 LAB
ALBUMIN SERPL ELPH-MCNC: 4.5 G/DL
ALP BLD-CCNC: 39 U/L
ALT SERPL-CCNC: 18 U/L
ANION GAP SERPL CALC-SCNC: 16 MMOL/L
AST SERPL-CCNC: 21 U/L
BASOPHILS # BLD AUTO: 0.04 K/UL
BASOPHILS NFR BLD AUTO: 0.6 %
BILIRUB SERPL-MCNC: 0.4 MG/DL
BUN SERPL-MCNC: 32 MG/DL
CALCIUM SERPL-MCNC: 9.7 MG/DL
CHLORIDE SERPL-SCNC: 103 MMOL/L
CO2 SERPL-SCNC: 23 MMOL/L
CREAT SERPL-MCNC: 1.24 MG/DL
CRP SERPL-MCNC: <3 MG/L
EGFRCR SERPLBLD CKD-EPI 2021: 48 ML/MIN/1.73M2
EOSINOPHIL # BLD AUTO: 0.1 K/UL
EOSINOPHIL NFR BLD AUTO: 1.5 %
ERYTHROCYTE [SEDIMENTATION RATE] IN BLOOD BY WESTERGREN METHOD: 21 MM/HR
FERRITIN SERPL-MCNC: 124 NG/ML
FOLATE SERPL-MCNC: 9.3 NG/ML
GLUCOSE SERPL-MCNC: 74 MG/DL
HAPTOGLOB SERPL-MCNC: 129 MG/DL
HCT VFR BLD CALC: 33.8 %
HGB A MFR BLD: 97.8 %
HGB A2 MFR BLD: 2.2 %
HGB BLD-MCNC: 11.2 G/DL
HGB FRACT BLD-IMP: NORMAL
IMM GRANULOCYTES NFR BLD AUTO: 0.2 %
IRON SATN MFR SERPL: 16 %
IRON SERPL-MCNC: 53 UG/DL
LDH SERPL-CCNC: 201 U/L
LYMPHOCYTES # BLD AUTO: 1.39 K/UL
LYMPHOCYTES NFR BLD AUTO: 21.5 %
MAN DIFF?: NORMAL
MCHC RBC-ENTMCNC: 28.2 PG
MCHC RBC-ENTMCNC: 33.1 G/DL
MCV RBC AUTO: 85.1 FL
MONOCYTES # BLD AUTO: 0.5 K/UL
MONOCYTES NFR BLD AUTO: 7.7 %
NEUTROPHILS # BLD AUTO: 4.44 K/UL
NEUTROPHILS NFR BLD AUTO: 68.5 %
PLATELET # BLD AUTO: 249 K/UL
POTASSIUM SERPL-SCNC: 4.7 MMOL/L
PROT SERPL-MCNC: 7.1 G/DL
RBC # BLD: 3.97 M/UL
RBC # BLD: 3.97 M/UL
RBC # FLD: 13.1 %
RETICS # AUTO: 1.2 %
RETICS AGGREG/RBC NFR: 47.2 K/UL
RHEUMATOID FACT SERPL-ACNC: <10 IU/ML
SODIUM SERPL-SCNC: 142 MMOL/L
TIBC SERPL-MCNC: 329 UG/DL
UIBC SERPL-MCNC: 276 UG/DL
VIT B12 SERPL-MCNC: 568 PG/ML
WBC # FLD AUTO: 6.48 K/UL

## 2025-09-25 LAB — ANA TITR SER: NEGATIVE

## (undated) DEVICE — ELCTR ROCKER SWITCH PENCIL BLUE 10FT

## (undated) DEVICE — POSITIONER FOAM EGG CRATE ULNAR 2PCS (PINK)